# Patient Record
Sex: FEMALE | Race: WHITE | NOT HISPANIC OR LATINO | Employment: FULL TIME | URBAN - METROPOLITAN AREA
[De-identification: names, ages, dates, MRNs, and addresses within clinical notes are randomized per-mention and may not be internally consistent; named-entity substitution may affect disease eponyms.]

---

## 2018-08-30 ENCOUNTER — APPOINTMENT (OUTPATIENT)
Dept: URGENT CARE | Age: 53
End: 2018-08-30
Payer: OTHER MISCELLANEOUS

## 2018-08-30 PROCEDURE — 99284 EMERGENCY DEPT VISIT MOD MDM: CPT | Performed by: PREVENTIVE MEDICINE

## 2018-08-30 PROCEDURE — G0383 LEV 4 HOSP TYPE B ED VISIT: HCPCS | Performed by: PREVENTIVE MEDICINE

## 2019-03-30 ENCOUNTER — HOSPITAL ENCOUNTER (EMERGENCY)
Facility: HOSPITAL | Age: 54
Discharge: HOME | End: 2019-03-31
Attending: EMERGENCY MEDICINE | Admitting: EMERGENCY MEDICINE
Payer: COMMERCIAL

## 2019-03-30 ENCOUNTER — APPOINTMENT (EMERGENCY)
Dept: RADIOLOGY | Facility: HOSPITAL | Age: 54
End: 2019-03-30
Payer: COMMERCIAL

## 2019-03-30 VITALS
RESPIRATION RATE: 16 BRPM | DIASTOLIC BLOOD PRESSURE: 81 MMHG | OXYGEN SATURATION: 95 % | HEART RATE: 102 BPM | TEMPERATURE: 97.5 F | WEIGHT: 202.6 LBS | HEIGHT: 67 IN | SYSTOLIC BLOOD PRESSURE: 128 MMHG | BODY MASS INDEX: 31.8 KG/M2

## 2019-03-30 DIAGNOSIS — M54.9 BACK PAIN: Primary | ICD-10-CM

## 2019-03-30 DIAGNOSIS — R05.3 CHRONIC COUGH: ICD-10-CM

## 2019-03-30 DIAGNOSIS — T14.8XXA MUSCLE STRAIN: ICD-10-CM

## 2019-03-30 PROCEDURE — 99283 EMERGENCY DEPT VISIT LOW MDM: CPT

## 2019-03-30 PROCEDURE — 71046 X-RAY EXAM CHEST 2 VIEWS: CPT

## 2019-03-30 RX ORDER — LORATADINE 10 MG/1
10 TABLET ORAL DAILY
COMMUNITY
End: 2022-03-29

## 2019-03-30 RX ORDER — MONTELUKAST SODIUM 10 MG/1
10 TABLET ORAL
COMMUNITY

## 2019-03-30 RX ORDER — ATORVASTATIN CALCIUM 20 MG/1
10 TABLET, FILM COATED ORAL DAILY
COMMUNITY

## 2019-03-30 RX ORDER — TIZANIDINE HYDROCHLORIDE 2 MG/1
2 CAPSULE, GELATIN COATED ORAL DAILY PRN
COMMUNITY

## 2019-03-30 RX ORDER — GABAPENTIN 600 MG/1
600 TABLET ORAL 3 TIMES DAILY
COMMUNITY

## 2019-03-31 RX ORDER — BENZONATATE 100 MG/1
100 CAPSULE ORAL EVERY 8 HOURS
Qty: 21 CAPSULE | Refills: 0 | Status: SHIPPED | OUTPATIENT
Start: 2019-03-31

## 2020-01-13 ENCOUNTER — HOSPITAL ENCOUNTER (EMERGENCY)
Facility: HOSPITAL | Age: 55
Discharge: HOME | End: 2020-01-13
Attending: EMERGENCY MEDICINE
Payer: COMMERCIAL

## 2020-01-13 ENCOUNTER — APPOINTMENT (EMERGENCY)
Dept: RADIOLOGY | Facility: HOSPITAL | Age: 55
End: 2020-01-13
Payer: COMMERCIAL

## 2020-01-13 VITALS
WEIGHT: 192.02 LBS | OXYGEN SATURATION: 98 % | HEART RATE: 101 BPM | BODY MASS INDEX: 30.53 KG/M2 | RESPIRATION RATE: 18 BRPM | DIASTOLIC BLOOD PRESSURE: 90 MMHG | TEMPERATURE: 97.8 F | SYSTOLIC BLOOD PRESSURE: 141 MMHG

## 2020-01-13 DIAGNOSIS — S16.1XXA STRAIN OF NECK MUSCLE, INITIAL ENCOUNTER: Primary | ICD-10-CM

## 2020-01-13 DIAGNOSIS — M54.12 CERVICAL RADICULAR PAIN: ICD-10-CM

## 2020-01-13 PROCEDURE — 99283 EMERGENCY DEPT VISIT LOW MDM: CPT

## 2020-01-13 PROCEDURE — 72040 X-RAY EXAM NECK SPINE 2-3 VW: CPT

## 2020-01-13 PROCEDURE — 96372 THER/PROPH/DIAG INJ SC/IM: CPT

## 2020-01-13 PROCEDURE — 99284 EMERGENCY DEPT VISIT MOD MDM: CPT | Performed by: PHYSICIAN ASSISTANT

## 2020-01-13 RX ORDER — PREDNISONE 20 MG/1
40 TABLET ORAL DAILY
Qty: 8 TABLET | Refills: 0 | Status: SHIPPED | OUTPATIENT
Start: 2020-01-13 | End: 2020-01-17

## 2020-01-13 RX ORDER — PREDNISONE 20 MG/1
60 TABLET ORAL ONCE
Status: COMPLETED | OUTPATIENT
Start: 2020-01-13 | End: 2020-01-13

## 2020-01-13 RX ORDER — LIDOCAINE 50 MG/G
1 PATCH TOPICAL ONCE
Status: DISCONTINUED | OUTPATIENT
Start: 2020-01-13 | End: 2020-01-13 | Stop reason: HOSPADM

## 2020-01-13 RX ORDER — CYCLOBENZAPRINE HCL 5 MG
TABLET ORAL
Qty: 12 TABLET | Refills: 0 | Status: SHIPPED | OUTPATIENT
Start: 2020-01-13 | End: 2022-03-29

## 2020-01-13 RX ORDER — KETOROLAC TROMETHAMINE 30 MG/ML
30 INJECTION, SOLUTION INTRAMUSCULAR; INTRAVENOUS ONCE
Status: COMPLETED | OUTPATIENT
Start: 2020-01-13 | End: 2020-01-13

## 2020-01-13 RX ADMIN — PREDNISONE 60 MG: 20 TABLET ORAL at 12:12

## 2020-01-13 RX ADMIN — KETOROLAC TROMETHAMINE 30 MG: 30 INJECTION, SOLUTION INTRAMUSCULAR at 11:30

## 2020-01-14 ENCOUNTER — TELEPHONE (OUTPATIENT)
Dept: PHYSICAL THERAPY | Facility: OTHER | Age: 55
End: 2020-01-14

## 2020-01-14 NOTE — ED PROVIDER NOTES
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  Notes that over the past week she has been doing mandatory overtime at work and has to wear a harness to do been counts  States the harness puts direct pressure on this area and pain has been worse over the past week  No new injury  States the pain is also worse with movement of the head and neck  History provided by:  Patient   used: No    Shoulder Pain   Injury: no    Pain details:     Quality:  Aching and cramping    Severity:  Moderate    Onset quality:  Gradual    Duration:  1 week    Timing:  Constant    Progression:  Waxing and waning  Handedness:  Right-handed  Prior injury to area:  Unable to specify  Relieved by:  Rest  Worsened by: Movement (pressure on area)  Associated symptoms: decreased range of motion and neck pain    Associated symptoms: no back pain, no fatigue, no fever, no muscle weakness, no numbness, no stiffness, no swelling and no tingling        Prior to Admission Medications   Prescriptions Last Dose Informant Patient Reported? Taking?    TiZANidine (ZANAFLEX) 2 MG capsule   Yes Yes   Sig: Take 2 mg by mouth daily as needed for muscle spasms   atorvastatin (LIPITOR) 20 mg tablet   Yes Yes   Sig: Take 10 mg by mouth daily   benzonatate (TESSALON PERLES) 100 mg capsule   No Yes   Sig: Take 1 capsule (100 mg total) by mouth every 8 (eight) hours   gabapentin (NEURONTIN) 600 MG tablet   Yes Yes   Sig: Take 600 mg by mouth 3 (three) times a day   loratadine (CLARITIN) 10 mg tablet   Yes Yes   Sig: Take 10 mg by mouth daily   metFORMIN (GLUCOPHAGE) 850 mg tablet   Yes Yes   Sig: Take 850 mg by mouth 2 (two) times a day with meals   montelukast (SINGULAIR) 10 mg tablet   Yes Yes   Sig: Take 10 mg by mouth daily at bedtime      Facility-Administered Medications: None       Past Medical History:   Diagnosis Date    Asthma     Fibromyalgia        Past Surgical History:   Procedure Laterality Date    ANKLE SURGERY Left     CARPAL TUNNEL RELEASE      CARPAL TUNNEL RELEASE      TUBAL LIGATION         History reviewed  No pertinent family history  I have reviewed and agree with the history as documented  Social History     Tobacco Use    Smoking status: Former Smoker    Smokeless tobacco: Former User   Substance Use Topics    Alcohol use: Yes     Comment: occ    Drug use: Never        Review of Systems   Constitutional: Negative for activity change, appetite change, chills, fatigue and fever  HENT: Negative for congestion, dental problem, drooling, ear discharge, ear pain, mouth sores, nosebleeds, rhinorrhea, sore throat and trouble swallowing  Eyes: Negative for pain, discharge and itching  Respiratory: Negative for cough, chest tightness, shortness of breath and wheezing  Cardiovascular: Negative for chest pain and palpitations  Gastrointestinal: Negative for abdominal pain, blood in stool, constipation, diarrhea, nausea and vomiting  Endocrine: Negative for cold intolerance and heat intolerance  Genitourinary: Negative for difficulty urinating, dysuria, flank pain, frequency and urgency  Musculoskeletal: Positive for neck pain  Negative for back pain and stiffness  Skin: Negative for rash and wound  Allergic/Immunologic: Negative for food allergies and immunocompromised state  Neurological: Negative for dizziness, seizures, syncope, weakness, numbness and headaches  Psychiatric/Behavioral: Negative for agitation, behavioral problems and confusion  Physical Exam  Physical Exam   Constitutional: She is oriented to person, place, and time  Vital signs are normal  She appears well-developed and well-nourished  HENT:   Head: Normocephalic and atraumatic  Cardiovascular: Normal rate and regular rhythm  Pulmonary/Chest: Effort normal and breath sounds normal  No respiratory distress  She has no wheezes  She has no rhonchi  She has no rales  Musculoskeletal:        Cervical back: She exhibits decreased range of motion, tenderness, pain and spasm  She exhibits no bony tenderness, no swelling, no edema, no deformity, no laceration and normal pulse  Back:    Neurological: She is alert and oriented to person, place, and time  She has normal strength  No cranial nerve deficit or sensory deficit  GCS eye subscore is 4  GCS verbal subscore is 5  GCS motor subscore is 6  Skin: Skin is warm and dry  Psychiatric: She has a normal mood and affect  Her behavior is normal    Nursing note and vitals reviewed        Vital Signs  ED Triage Vitals [01/13/20 1058]   Temperature Pulse Respirations Blood Pressure SpO2   97 8 °F (36 6 °C) 101 18 141/90 98 %      Temp Source Heart Rate Source Patient Position - Orthostatic VS BP Location FiO2 (%)   Oral Monitor Lying Right arm --      Pain Score       9           Vitals:    01/13/20 1058   BP: 141/90   Pulse: 101   Patient Position - Orthostatic VS: Lying         Visual Acuity      ED Medications  Medications   ketorolac (TORADOL) injection 30 mg (30 mg Intramuscular Given 1/13/20 1130)   predniSONE tablet 60 mg (60 mg Oral Given 1/13/20 1212)       Diagnostic Studies  Results Reviewed     None                 XR cervical spine 2 or 3 views   ED Interpretation by Jairon Alexandra PA-C (01/13 1201)   Straightening of the cervical spine consistent with spasm  Final Result by Natalie Zepeda DO (01/13 1310)      No acute osseous abnormality  Degenerative changes as above  Workstation performed: [id number]                    Procedures  Procedures         ED Course                               MDM  Number of Diagnoses or Management Options  Cervical radicular pain:   Strain of neck muscle, initial encounter:   Diagnosis management comments: Differential diagnosis includes but not limited to:  Cervical strain, radicular pain, DJD, disc herniation, muscle spasm      Discussed treatment plan with patient  She blood sugars currently well controlled  Will start short course of prednisone  Will monitor closely  Amount and/or Complexity of Data Reviewed  Tests in the radiology section of CPT®: reviewed and ordered  Independent visualization of images, tracings, or specimens: yes          Disposition  Final diagnoses:   Strain of neck muscle, initial encounter   Cervical radicular pain     Time reflects when diagnosis was documented in both MDM as applicable and the Disposition within this note     Time User Action Codes Description Comment    1/13/2020 12:03 PM Viky Berg 28 [S16  1XXA] Strain of neck muscle, initial encounter     1/13/2020 12:03 PM Deedee Berg Em Add [U08 91] Cervical radicular pain       ED Disposition     ED Disposition Condition Date/Time Comment    Discharge Stable Mon Jan 13, 2020 12:03 PM Muriel Patton [facility] discharge to home/self care              Follow-up Information    None         Discharge Medication List as of 1/13/2020 12:04 PM      START taking these medications    Details   cyclobenzaprine (FLEXERIL) 5 mg tablet 1-2 PO TID PRN, Normal      predniSONE 20 mg tablet Take 2 tablets (40 mg total) by mouth daily for 4 days, Starting Mon 1/13/2020, Until Fri 1/17/2020, Normal         CONTINUE these medications which have NOT CHANGED    Details   atorvastatin (LIPITOR) 20 mg tablet Take 10 mg by mouth daily, Historical Med      benzonatate (TESSALON PERLES) 100 mg capsule Take 1 capsule (100 mg total) by mouth every 8 (eight) hours, Starting Sun 3/31/2019, Print      gabapentin (NEURONTIN) 600 MG tablet Take 600 mg by mouth 3 (three) times a day, Historical Med      loratadine (CLARITIN) 10 mg tablet Take 10 mg by mouth daily, Historical Med      metFORMIN (GLUCOPHAGE) 850 mg tablet Take 850 mg by mouth 2 (two) times a day with meals, Historical Med      montelukast (SINGULAIR) 10 mg tablet Take 10 mg by mouth daily at bedtime, Historical Med      TiZANidine (ZANAFLEX) 2 MG capsule Take 2 mg by mouth daily as needed for muscle spasms, Historical Med               ED Provider  Electronically Signed by           Sylvia Barahona PA-C  01/14/20 6058

## 2020-01-14 NOTE — TELEPHONE ENCOUNTER
Message left for Pt to call us back  Our ph # and hours of business provided  Waiting for return call from Pt  This was our first time calling Pt, and need to verify if Pt is using Workers Comp

## 2020-01-16 ENCOUNTER — TELEPHONE (OUTPATIENT)
Dept: PHYSICAL THERAPY | Facility: OTHER | Age: 55
End: 2020-01-16

## 2020-01-16 NOTE — TELEPHONE ENCOUNTER
Voice mail/message left for patient to return call to [place] [id number] program including our hours of business and phone number  Second attempt to reach patient regarding referral and discuss possible WC status  Deferred per protocol for f/u

## 2020-01-23 ENCOUNTER — TELEPHONE (OUTPATIENT)
Dept: PHYSICAL THERAPY | Facility: OTHER | Age: 55
End: 2020-01-23

## 2020-01-23 NOTE — TELEPHONE ENCOUNTER
Message left for Pt to call us back  Our ph # and hours of business provided  Waiting for return call from Pt  This was our 3rd attempt at calling this Pt      Referral closed

## 2020-01-23 NOTE — TELEPHONE ENCOUNTER
Patient returned our call  Nurse reviewed [facility] program and offerings  Patient declined to participate at this time  Patient stated she will contact her PCP for possible referral to PM for chronic pain as she has pain "in other areas" as well  Nurse encouraged her to call our program back in the future if needed  Agreed and appreciative of information

## 2020-09-11 ENCOUNTER — TELEPHONE (OUTPATIENT)
Dept: PAIN MEDICINE | Facility: MEDICAL CENTER | Age: 55
End: 2020-09-11

## 2020-10-09 ENCOUNTER — CONSULT (OUTPATIENT)
Dept: PAIN MEDICINE | Facility: MEDICAL CENTER | Age: 55
End: 2020-10-09
Payer: COMMERCIAL

## 2020-10-09 ENCOUNTER — APPOINTMENT (OUTPATIENT)
Dept: RADIOLOGY | Facility: MEDICAL CENTER | Age: 55
End: 2020-10-09
Payer: COMMERCIAL

## 2020-10-09 VITALS
HEIGHT: 67 IN | BODY MASS INDEX: 30.32 KG/M2 | HEART RATE: 101 BPM | SYSTOLIC BLOOD PRESSURE: 123 MMHG | WEIGHT: 193.2 LBS | DIASTOLIC BLOOD PRESSURE: 76 MMHG | TEMPERATURE: 97.7 F

## 2020-10-09 DIAGNOSIS — M54.12 CERVICAL RADICULOPATHY: ICD-10-CM

## 2020-10-09 DIAGNOSIS — M47.816 LUMBAR SPONDYLOSIS: ICD-10-CM

## 2020-10-09 DIAGNOSIS — G89.4 CHRONIC PAIN SYNDROME: ICD-10-CM

## 2020-10-09 DIAGNOSIS — M79.18 MYOFASCIAL PAIN SYNDROME: ICD-10-CM

## 2020-10-09 DIAGNOSIS — M54.2 NECK PAIN: ICD-10-CM

## 2020-10-09 DIAGNOSIS — M47.816 LUMBAR SPONDYLOSIS: Primary | ICD-10-CM

## 2020-10-09 PROCEDURE — 72114 X-RAY EXAM L-S SPINE BENDING: CPT

## 2020-10-09 PROCEDURE — 99244 OFF/OP CNSLTJ NEW/EST MOD 40: CPT | Performed by: PHYSICAL MEDICINE & REHABILITATION

## 2020-10-09 RX ORDER — NAPROXEN 500 MG/1
500 TABLET ORAL 2 TIMES DAILY WITH MEALS
Qty: 60 TABLET | Refills: 1 | Status: SHIPPED | OUTPATIENT
Start: 2020-10-09 | End: 2020-12-22 | Stop reason: SDUPTHER

## 2020-10-26 ENCOUNTER — HOSPITAL ENCOUNTER (OUTPATIENT)
Dept: MRI IMAGING | Facility: CLINIC | Age: 55
Discharge: HOME | End: 2020-10-26
Payer: COMMERCIAL

## 2020-10-26 DIAGNOSIS — M54.2 NECK PAIN: ICD-10-CM

## 2020-10-26 DIAGNOSIS — M54.12 CERVICAL RADICULOPATHY: ICD-10-CM

## 2020-10-26 DIAGNOSIS — M47.816 LUMBAR SPONDYLOSIS: ICD-10-CM

## 2020-10-26 DIAGNOSIS — G89.4 CHRONIC PAIN SYNDROME: ICD-10-CM

## 2020-10-26 DIAGNOSIS — M79.18 MYOFASCIAL PAIN SYNDROME: ICD-10-CM

## 2020-10-26 PROCEDURE — G1004 CDSM NDSC: HCPCS

## 2020-10-26 PROCEDURE — 72141 MRI NECK SPINE W/O DYE: CPT

## 2020-10-28 ENCOUNTER — HOSPITAL ENCOUNTER (OUTPATIENT)
Dept: RADIOLOGY | Facility: CLINIC | Age: 55
Discharge: HOME | End: 2020-10-28
Attending: PHYSICAL MEDICINE & REHABILITATION | Admitting: PHYSICAL MEDICINE & REHABILITATION
Payer: COMMERCIAL

## 2020-10-28 VITALS
RESPIRATION RATE: 18 BRPM | DIASTOLIC BLOOD PRESSURE: 81 MMHG | TEMPERATURE: 97.9 F | SYSTOLIC BLOOD PRESSURE: 132 MMHG | HEART RATE: 94 BPM | OXYGEN SATURATION: 97 %

## 2020-10-28 DIAGNOSIS — M47.816 LUMBAR SPONDYLOSIS: ICD-10-CM

## 2020-10-28 DIAGNOSIS — G89.4 CHRONIC PAIN SYNDROME: ICD-10-CM

## 2020-10-28 DIAGNOSIS — M79.18 MYOFASCIAL PAIN SYNDROME: ICD-10-CM

## 2020-10-28 PROCEDURE — 64494 INJ PARAVERT F JNT L/S 2 LEV: CPT | Performed by: PHYSICAL MEDICINE & REHABILITATION

## 2020-10-28 PROCEDURE — 64493 INJ PARAVERT F JNT L/S 1 LEV: CPT | Performed by: PHYSICAL MEDICINE & REHABILITATION

## 2020-10-28 RX ORDER — BUPIVACAINE HCL/PF 2.5 MG/ML
5 VIAL (ML) INJECTION ONCE
Status: COMPLETED | OUTPATIENT
Start: 2020-10-28 | End: 2020-10-28

## 2020-10-28 RX ADMIN — BUPIVACAINE HYDROCHLORIDE 3 ML: 2.5 INJECTION, SOLUTION EPIDURAL; INFILTRATION; INTRACAUDAL at 09:09

## 2020-11-04 ENCOUNTER — TRANSCRIBE ORDERS (OUTPATIENT)
Dept: PAIN MEDICINE | Facility: CLINIC | Age: 55
End: 2020-11-04

## 2020-11-05 ENCOUNTER — TELEPHONE (OUTPATIENT)
Dept: PAIN MEDICINE | Facility: CLINIC | Age: 55
End: 2020-11-05

## 2020-12-22 DIAGNOSIS — M47.816 LUMBAR SPONDYLOSIS: ICD-10-CM

## 2020-12-22 DIAGNOSIS — M79.18 MYOFASCIAL PAIN SYNDROME: ICD-10-CM

## 2020-12-22 DIAGNOSIS — G89.4 CHRONIC PAIN SYNDROME: ICD-10-CM

## 2020-12-22 DIAGNOSIS — M54.2 NECK PAIN: ICD-10-CM

## 2020-12-22 DIAGNOSIS — M54.12 CERVICAL RADICULOPATHY: ICD-10-CM

## 2020-12-23 ENCOUNTER — HOSPITAL ENCOUNTER (OUTPATIENT)
Dept: RADIOLOGY | Facility: CLINIC | Age: 55
Discharge: HOME | End: 2020-12-23
Attending: PHYSICAL MEDICINE & REHABILITATION | Admitting: PHYSICAL MEDICINE & REHABILITATION
Payer: COMMERCIAL

## 2020-12-23 VITALS
DIASTOLIC BLOOD PRESSURE: 72 MMHG | RESPIRATION RATE: 18 BRPM | SYSTOLIC BLOOD PRESSURE: 129 MMHG | HEART RATE: 84 BPM | TEMPERATURE: 97.7 F | OXYGEN SATURATION: 97 %

## 2020-12-23 DIAGNOSIS — M47.816 LUMBAR SPONDYLOSIS: ICD-10-CM

## 2020-12-23 PROCEDURE — 64493 INJ PARAVERT F JNT L/S 1 LEV: CPT | Performed by: PHYSICAL MEDICINE & REHABILITATION

## 2020-12-23 PROCEDURE — 64490 INJ PARAVERT F JNT C/T 1 LEV: CPT | Performed by: PHYSICAL MEDICINE & REHABILITATION

## 2020-12-23 RX ORDER — NAPROXEN 500 MG/1
500 TABLET ORAL 2 TIMES DAILY WITH MEALS
Qty: 60 TABLET | Refills: 0 | Status: SHIPPED | OUTPATIENT
Start: 2020-12-23 | End: 2022-03-29

## 2020-12-23 RX ORDER — BUPIVACAINE HYDROCHLORIDE 7.5 MG/ML
5 INJECTION, SOLUTION EPIDURAL; RETROBULBAR ONCE
Status: COMPLETED | OUTPATIENT
Start: 2020-12-23 | End: 2020-12-23

## 2020-12-23 RX ADMIN — BUPIVACAINE HYDROCHLORIDE 3 ML: 7.5 INJECTION, SOLUTION EPIDURAL; RETROBULBAR at 08:16

## 2021-01-04 ENCOUNTER — TRANSCRIBE ORDERS (OUTPATIENT)
Dept: PAIN MEDICINE | Facility: CLINIC | Age: 56
End: 2021-01-04

## 2021-01-04 ENCOUNTER — TELEPHONE (OUTPATIENT)
Dept: PAIN MEDICINE | Facility: CLINIC | Age: 56
End: 2021-01-04

## 2021-01-12 NOTE — TELEPHONE ENCOUNTER
Patient called requesting to know when her ablation procedure is going to be scheduled   Please advise, ajay    Call back# [phone]

## 2021-01-12 NOTE — TELEPHONE ENCOUNTER
Pain diary reviewed  Results look good  Please schedule patient for left and then right sided T12-L2 radiofrequency ablation  Thank you

## 2021-03-10 DIAGNOSIS — Z23 ENCOUNTER FOR IMMUNIZATION: ICD-10-CM

## 2021-05-04 ENCOUNTER — OFFICE VISIT (OUTPATIENT)
Dept: URGENT CARE | Facility: CLINIC | Age: 56
End: 2021-05-04
Payer: COMMERCIAL

## 2021-05-04 VITALS
HEART RATE: 110 BPM | SYSTOLIC BLOOD PRESSURE: 131 MMHG | RESPIRATION RATE: 18 BRPM | OXYGEN SATURATION: 99 % | DIASTOLIC BLOOD PRESSURE: 79 MMHG | TEMPERATURE: 98.3 F

## 2021-05-04 DIAGNOSIS — J06.9 ACUTE URI: Primary | ICD-10-CM

## 2021-05-04 PROCEDURE — 99213 OFFICE O/P EST LOW 20 MIN: CPT | Performed by: PHYSICIAN ASSISTANT

## 2021-05-04 PROCEDURE — U0003 INFECTIOUS AGENT DETECTION BY NUCLEIC ACID (DNA OR RNA); SEVERE ACUTE RESPIRATORY SYNDROME CORONAVIRUS 2 (SARS-COV-2) (CORONAVIRUS DISEASE [COVID-19]), AMPLIFIED PROBE TECHNIQUE, MAKING USE OF HIGH THROUGHPUT TECHNOLOGIES AS DESCRIBED BY CMS-2020-01-R: HCPCS | Performed by: PHYSICIAN ASSISTANT

## 2021-05-04 PROCEDURE — U0005 INFEC AGEN DETEC AMPLI PROBE: HCPCS | Performed by: PHYSICIAN ASSISTANT

## 2021-05-04 RX ORDER — FLUTICASONE PROPIONATE 50 MCG
SPRAY, SUSPENSION (ML) NASAL
COMMUNITY
End: 2022-03-29 | Stop reason: SDUPTHER

## 2021-05-04 RX ORDER — AMITRIPTYLINE HYDROCHLORIDE 25 MG/1
25 TABLET, FILM COATED ORAL
COMMUNITY
Start: 2021-01-06

## 2021-05-04 RX ORDER — PANTOPRAZOLE SODIUM 40 MG/1
40 TABLET, DELAYED RELEASE ORAL DAILY
COMMUNITY
Start: 2021-02-17 | End: 2022-03-29

## 2021-05-04 RX ORDER — TRAZODONE HYDROCHLORIDE 100 MG/1
TABLET ORAL
COMMUNITY
Start: 2021-03-22

## 2021-05-04 RX ORDER — MECLIZINE HYDROCHLORIDE 25 MG/1
25 TABLET ORAL 3 TIMES DAILY PRN
Qty: 30 TABLET | Refills: 0 | Status: SHIPPED | OUTPATIENT
Start: 2021-05-04

## 2021-05-04 NOTE — PATIENT INSTRUCTIONS
Hydration and rest   Tylenol and Motrin for headache or fevers  Over-the-counter decongestants  Meclizine as needed  COVID-19 testing  Home isolation  PCP follow-up  Go to emergency room with worsening symptoms, difficulty breathing  Recommended supplements for potential COVID-19 is the following: Vitamin D3 2000 IU  daily ,  Vitamin C 1g  every 12 hours , Multivitamin Daily     COVID-19 Home Care Guidelines    Your healthcare provider and/or public health staff have evaluated you and have determined that you do not need to remain in the hospital at this time  At this time you can be isolated at home where you will be monitored by staff from your local or state health department  You should carefully follow the prevention and isolation steps below until a healthcare provider or local or state health department says that you can return to your normal activities  Stay home except to get medical care    People who are mildly ill with COVID-19 are able to isolate at home during their illness  You should restrict activities outside your home, except for getting medical care  Do not go to work, school, or public areas  Avoid using public transportation, ride-sharing, or taxis  Separate yourself from other people and animals in your home    People: As much as possible, you should stay in a specific room and away from other people in your home  Also, you should use a separate bathroom, if available  Animals: You should restrict contact with pets and other animals while you are sick with COVID-19, just like you would around other people  Although there have not been reports of pets or other animals becoming sick with COVID-19, it is still recommended that people sick with COVID-19 limit contact with animals until more information is known about the virus  When possible, have another member of your household care for your animals while you are sick   If you are sick with COVID-19, avoid contact with your pet, including petting, snuggling, being kissed or licked, and sharing food  If you must care for your pet or be around animals while you are sick, wash your hands before and after you interact with pets and wear a facemask  See COVID-19 and Animals for more information  Call ahead before visiting your doctor    If you have a medical appointment, call the healthcare provider and tell them that you have or may have COVID-19  This will help the healthcare providers office take steps to keep other people from getting infected or exposed  Wear a facemask    You should wear a facemask when you are around other people (e g , sharing a room or vehicle) or pets and before you enter a healthcare providers office  If you are not able to wear a facemask (for example, because it causes trouble breathing), then people who live with you should not stay in the same room with you, or they should wear a facemask if they enter your room  Cover your coughs and sneezes    Cover your mouth and nose with a tissue when you cough or sneeze  Throw used tissues in a lined trash can  Immediately wash your hands with soap and water for at least 20 seconds or, if soap and water are not available, clean your hands with an alcohol-based hand  that contains at least 60% alcohol  Clean your hands often    Wash your hands often with soap and water for at least 20 seconds, especially after blowing your nose, coughing, or sneezing; going to the bathroom; and before eating or preparing food  If soap and water are not readily available, use an alcohol-based hand  with at least 60% alcohol, covering all surfaces of your hands and rubbing them together until they feel dry  Soap and water are the best option if hands are visibly dirty  Avoid touching your eyes, nose, and mouth with unwashed hands      Avoid sharing personal household items    You should not share dishes, drinking glasses, cups, eating utensils, towels, or bedding with other people or pets in your home  After using these items, they should be washed thoroughly with soap and water  Clean all high-touch surfaces everyday    High touch surfaces include counters, tabletops, doorknobs, bathroom fixtures, toilets, phones, keyboards, tablets, and bedside tables  Also, clean any surfaces that may have blood, stool, or body fluids on them  Use a household cleaning spray or wipe, according to the label instructions  Labels contain instructions for safe and effective use of the cleaning product including precautions you should take when applying the product, such as wearing gloves and making sure you have good ventilation during use of the product  Monitor your symptoms    Seek prompt medical attention if your illness is worsening (e g , difficulty breathing)  Before seeking care, call your healthcare provider and tell them that you have, or are being evaluated for, COVID-19  Put on a facemask before you enter the facility  These steps will help the healthcare providers office to keep other people in the office or waiting room from getting infected or exposed  Ask your healthcare provider to call the local or [facility] health department  Persons who are placed under active monitoring or facilitated self-monitoring should follow instructions provided by their local health department or occupational health professionals, as appropriate  If you have a medical emergency and need to call 911, notify the dispatch personnel that you have, or are being evaluated for COVID-19  If possible, put on a facemask before emergency medical services arrive      Discontinuing home isolation    Patients with confirmed COVID-19 should remain under home isolation precautions until the following conditions are met:   - They have had no fever for at least 24 hours (that is one full day of no fever without the use medicine that reduces fevers)  AND  - other symptoms have improved (for example, when their cough or shortness of breath have improved)  AND  - If had mild or moderate illness, at least 10 days have passed since their symptoms first appeared or if severe illness (needed oxygen) or immunosuppressed, at least 20 days have passed since symptoms first appeared  Patients with confirmed COVID-19 should also notify close contacts (including their workplace) and ask that they self-quarantine  Currently, close contact is defined as being within 6 feet for 15 minutes or more from the period 24 hours starting 48 hours before symptom onset to the time at which the patient went into isolation  Close contacts of patients diagnosed with COVID-19 should be instructed by the patient to self-quarantine for 14 days from the last time of their last contact with the patient       Source: [employer]

## 2021-05-04 NOTE — PROGRESS NOTES
[facility]        NAME: Marylen Estelle is a 49 y o  female  : 1965    MRN: [id number]  DATE: May 4, 2021  TIME: 9:00 AM    Assessment and Plan   Acute URI [J06 9]  1  Acute URI  Novel Coronavirus (Covid-19),[facility]    meclizine (ANTIVERT) 25 mg tablet         Patient Instructions     Patient Instructions     Hydration and rest   Tylenol and Motrin for headache or fevers  Over-the-counter decongestants  Meclizine as needed  COVID-19 testing  Home isolation  PCP follow-up  Go to emergency room with worsening symptoms, difficulty breathing  Recommended supplements for potential COVID-19 is the following: Vitamin D3 2000 IU  daily ,  Vitamin C 1g  every 12 hours , Multivitamin Daily     COVID-19 Home Care Guidelines    Your healthcare provider and/or public health staff have evaluated you and have determined that you do not need to remain in the hospital at this time  At this time you can be isolated at home where you will be monitored by staff from your local or state health department  You should carefully follow the prevention and isolation steps below until a healthcare provider or local or state health department says that you can return to your normal activities  Stay home except to get medical care    People who are mildly ill with COVID-19 are able to isolate at home during their illness  You should restrict activities outside your home, except for getting medical care  Do not go to work, school, or public areas  Avoid using public transportation, ride-sharing, or taxis  Separate yourself from other people and animals in your home    People: As much as possible, you should stay in a specific room and away from other people in your home  Also, you should use a separate bathroom, if available  Animals: You should restrict contact with pets and other animals while you are sick with COVID-19, just like you would around other people   Although there have not been reports of pets or other animals becoming sick with COVID-19, it is still recommended that people sick with COVID-19 limit contact with animals until more information is known about the virus  When possible, have another member of your household care for your animals while you are sick  If you are sick with COVID-19, avoid contact with your pet, including petting, snuggling, being kissed or licked, and sharing food  If you must care for your pet or be around animals while you are sick, wash your hands before and after you interact with pets and wear a facemask  See COVID-19 and Animals for more information  Call ahead before visiting your doctor    If you have a medical appointment, call the healthcare provider and tell them that you have or may have COVID-19  This will help the healthcare providers office take steps to keep other people from getting infected or exposed  Wear a facemask    You should wear a facemask when you are around other people (e g , sharing a room or vehicle) or pets and before you enter a healthcare providers office  If you are not able to wear a facemask (for example, because it causes trouble breathing), then people who live with you should not stay in the same room with you, or they should wear a facemask if they enter your room  Cover your coughs and sneezes    Cover your mouth and nose with a tissue when you cough or sneeze  Throw used tissues in a lined trash can  Immediately wash your hands with soap and water for at least 20 seconds or, if soap and water are not available, clean your hands with an alcohol-based hand  that contains at least 60% alcohol  Clean your hands often    Wash your hands often with soap and water for at least 20 seconds, especially after blowing your nose, coughing, or sneezing; going to the bathroom; and before eating or preparing food   If soap and water are not readily available, use an alcohol-based hand  with at least 60% alcohol, covering all surfaces of your hands and rubbing them together until they feel dry  Soap and water are the best option if hands are visibly dirty  Avoid touching your eyes, nose, and mouth with unwashed hands  Avoid sharing personal household items    You should not share dishes, drinking glasses, cups, eating utensils, towels, or bedding with other people or pets in your home  After using these items, they should be washed thoroughly with soap and water  Clean all high-touch surfaces everyday    High touch surfaces include counters, tabletops, doorknobs, bathroom fixtures, toilets, phones, keyboards, tablets, and bedside tables  Also, clean any surfaces that may have blood, stool, or body fluids on them  Use a household cleaning spray or wipe, according to the label instructions  Labels contain instructions for safe and effective use of the cleaning product including precautions you should take when applying the product, such as wearing gloves and making sure you have good ventilation during use of the product  Monitor your symptoms    Seek prompt medical attention if your illness is worsening (e g , difficulty breathing)  Before seeking care, call your healthcare provider and tell them that you have, or are being evaluated for, COVID-19  Put on a facemask before you enter the facility  These steps will help the healthcare providers office to keep other people in the office or waiting room from getting infected or exposed  Ask your healthcare provider to call the local or [facility] health department  Persons who are placed under active monitoring or facilitated self-monitoring should follow instructions provided by their local health department or occupational health professionals, as appropriate  If you have a medical emergency and need to call 911, notify the dispatch personnel that you have, or are being evaluated for COVID-19   If possible, put on a facemask before emergency medical services arrive  Discontinuing home isolation    Patients with confirmed COVID-19 should remain under home isolation precautions until the following conditions are met:   - They have had no fever for at least 24 hours (that is one full day of no fever without the use medicine that reduces fevers)  AND  - other symptoms have improved (for example, when their cough or shortness of breath have improved)  AND  - If had mild or moderate illness, at least 10 days have passed since their symptoms first appeared or if severe illness (needed oxygen) or immunosuppressed, at least 20 days have passed since symptoms first appeared  Patients with confirmed COVID-19 should also notify close contacts (including their workplace) and ask that they self-quarantine  Currently, close contact is defined as being within 6 feet for 15 minutes or more from the period 24 hours starting 48 hours before symptom onset to the time at which the patient went into isolation  Close contacts of patients diagnosed with COVID-19 should be instructed by the patient to self-quarantine for 14 days from the last time of their last contact with the patient  Source: [employer]          **Portions of the record may have been created with voice recognition software  Occasional wrong word or "sound a like" substitutions may have occurred due to the inherent limitations of voice recognition software  Read the chart carefully and recognize, using context, where substitutions have occurred  **     Chief Complaint     Chief Complaint   Patient presents with    Earache     Pt c/o earache and vertigo         History of Present Illness       51-year-old female presents to clinic with complaints of fatigue, dizziness, headache and congestion x1 day  She reports occasional cough  States history of seasonal allergies and asthma , as well as vertigo    She denies any fever, chills, nausea, vomiting, diarrhea,  shortness of breath   0 got patient had her COVID-19 vaccination back in March  No known sick contacts or recent travel  Review of Systems     Review of Systems   Constitutional: Positive for fatigue  Negative for chills and fever  HENT: Positive for congestion and ear pain  Negative for sneezing and sore throat  Respiratory: Positive for cough  Negative for shortness of breath  Cardiovascular: Negative for chest pain  Gastrointestinal: Negative for diarrhea, nausea and vomiting  Musculoskeletal: Negative for myalgias  Skin: Negative for rash  Neurological: Positive for dizziness and headaches           Current Medications     Current Outpatient Medications:     amitriptyline (ELAVIL) 25 mg tablet, Take 25 mg by mouth, Disp: , Rfl:     atorvastatin (LIPITOR) 20 mg tablet, Take 10 mg by mouth daily, Disp: , Rfl:     fluticasone (FLONASE) 50 mcg/act nasal spray, fluticasone propionate 50 mcg/actuation nasal spray,suspension, Disp: , Rfl:     gabapentin (NEURONTIN) 600 MG tablet, Take 600 mg by mouth 3 (three) times a day, Disp: , Rfl:     metFORMIN (GLUCOPHAGE) 850 mg tablet, Take 850 mg by mouth 2 (two) times a day with meals, Disp: , Rfl:     montelukast (SINGULAIR) 10 mg tablet, Take 10 mg by mouth daily at bedtime, Disp: , Rfl:     pantoprazole (PROTONIX) 40 mg tablet, Take 40 mg by mouth daily, Disp: , Rfl:     TiZANidine (ZANAFLEX) 2 MG capsule, Take 2 mg by mouth daily as needed for muscle spasms, Disp: , Rfl:     traZODone (DESYREL) 100 mg tablet, TAKE 1 TABLET NIGHTLY, Disp: , Rfl:     benzonatate (TESSALON PERLES) 100 mg capsule, Take 1 capsule (100 mg total) by mouth every 8 (eight) hours (Patient not taking: Reported on 5/4/2021), Disp: 21 capsule, Rfl: 0    cyclobenzaprine (FLEXERIL) 5 mg tablet, 1-2 PO TID PRN (Patient not taking: Reported on 5/4/2021), Disp: 12 tablet, Rfl: 0    loratadine (CLARITIN) 10 mg tablet, Take 10 mg by mouth daily, Disp: , Rfl:    meclizine (ANTIVERT) 25 mg tablet, Take 1 tablet (25 mg total) by mouth 3 (three) times a day as needed for dizziness, Disp: 30 tablet, Rfl: 0    naproxen (NAPROSYN) 500 mg tablet, Take 1 tablet (500 mg total) by mouth 2 (two) times a day with meals (Patient not taking: Reported on 5/4/2021), Disp: 60 tablet, Rfl: 0    Current Allergies     Allergies as of 05/04/2021 - Reviewed 05/04/2021   Allergen Reaction Noted    Latex  01/13/2020            The following portions of the patient's history were reviewed and updated as appropriate: allergies, current medications, past family history, past medical history, past social history, past surgical history and problem list      Past Medical History:   Diagnosis Date    Asthma     Fibromyalgia        Past Surgical History:   Procedure Laterality Date    ANKLE SURGERY Left     CARPAL TUNNEL RELEASE      CARPAL TUNNEL RELEASE      TUBAL LIGATION         History reviewed  No pertinent family history  Medications have been verified  Objective     /79   Pulse (!) 110   Temp 98 3 °F (36 8 °C) (Temporal)   Resp 18   SpO2 99%        Physical Exam     Physical Exam  Vitals signs and nursing note reviewed  Constitutional:       General: She is not in acute distress  Appearance: Normal appearance  She is not ill-appearing  HENT:      Head: Normocephalic and atraumatic  Right Ear: Tympanic membrane and ear canal normal       Left Ear: Tympanic membrane and ear canal normal       Nose: Congestion and rhinorrhea present  Mouth/Throat:      Pharynx: Posterior oropharyngeal erythema present  Cardiovascular:      Rate and Rhythm: Regular rhythm  Tachycardia present  Pulmonary:      Effort: Pulmonary effort is normal       Breath sounds: Normal breath sounds  Skin:     General: Skin is warm and dry  Findings: No rash  Neurological:      Mental Status: She is alert

## 2021-05-04 NOTE — LETTER
May 4, 2021     Patient: Hernandez Sewell   YOB: 1965   Date of Visit: 5/4/2021       To Whom it May Concern:    Rockwell Hodgkin was seen in my clinic on 5/4/2021  May not return to work until COVID-19 test results  If you have any questions or concerns, please don't hesitate to call           Sincerely,          Agata Hernandez PA-C        CC: No Recipients

## 2021-05-05 LAB — SARS-COV-2 RNA RESP QL NAA+PROBE: NEGATIVE

## 2021-05-10 DIAGNOSIS — J06.9 ACUTE URI: ICD-10-CM

## 2021-05-11 RX ORDER — MECLIZINE HYDROCHLORIDE 25 MG/1
TABLET ORAL
Qty: 30 TABLET | Refills: 0 | OUTPATIENT
Start: 2021-05-11

## 2021-07-07 ENCOUNTER — HOSPITAL ENCOUNTER (EMERGENCY)
Facility: HOSPITAL | Age: 56
Discharge: HOME | End: 2021-07-07
Attending: EMERGENCY MEDICINE | Admitting: EMERGENCY MEDICINE
Payer: COMMERCIAL

## 2021-07-07 ENCOUNTER — APPOINTMENT (EMERGENCY)
Dept: CT IMAGING | Facility: HOSPITAL | Age: 56
End: 2021-07-07
Payer: COMMERCIAL

## 2021-07-07 VITALS
OXYGEN SATURATION: 97 % | HEART RATE: 94 BPM | RESPIRATION RATE: 18 BRPM | DIASTOLIC BLOOD PRESSURE: 107 MMHG | WEIGHT: 200 LBS | SYSTOLIC BLOOD PRESSURE: 164 MMHG | BODY MASS INDEX: 32.28 KG/M2 | TEMPERATURE: 98 F

## 2021-07-07 DIAGNOSIS — R51.9 HEADACHE: ICD-10-CM

## 2021-07-07 DIAGNOSIS — J32.2 ETHMOID SINUSITIS: Primary | ICD-10-CM

## 2021-07-07 LAB
ANION GAP SERPL CALCULATED.3IONS-SCNC: 10 MMOL/L (ref 4–13)
BASOPHILS # BLD AUTO: 0.01 THOUSANDS/ΜL (ref 0–0.1)
BASOPHILS NFR BLD AUTO: 0 % (ref 0–1)
BUN SERPL-MCNC: 19 MG/DL (ref 5–25)
CALCIUM SERPL-MCNC: 9.6 MG/DL (ref 8.3–10.1)
CHLORIDE SERPL-SCNC: 104 MMOL/L (ref 100–108)
CO2 SERPL-SCNC: 29 MMOL/L (ref 21–32)
CREAT SERPL-MCNC: 0.79 MG/DL (ref 0.6–1.3)
EOSINOPHIL # BLD AUTO: 0.13 THOUSAND/ΜL (ref 0–0.61)
EOSINOPHIL NFR BLD AUTO: 3 % (ref 0–6)
ERYTHROCYTE [DISTWIDTH] IN BLOOD BY AUTOMATED COUNT: 13.9 % (ref 11.6–15.1)
ERYTHROCYTE [SEDIMENTATION RATE] IN BLOOD: 8 MM/HOUR (ref 0–29)
EXT PREG TEST URINE: NEGATIVE
EXT. CONTROL ED NAV: NORMAL
GFR SERPL CREATININE-BSD FRML MDRD: 85 ML/MIN/1.73SQ M
GLUCOSE SERPL-MCNC: 120 MG/DL (ref 65–140)
HCT VFR BLD AUTO: 39.4 % (ref 34.8–46.1)
HGB BLD-MCNC: 12.4 G/DL (ref 11.5–15.4)
IMM GRANULOCYTES # BLD AUTO: 0.01 THOUSAND/UL (ref 0–0.2)
IMM GRANULOCYTES NFR BLD AUTO: 0 % (ref 0–2)
LYMPHOCYTES # BLD AUTO: 1.26 THOUSANDS/ΜL (ref 0.6–4.47)
LYMPHOCYTES NFR BLD AUTO: 27 % (ref 14–44)
MCH RBC QN AUTO: 24.8 PG (ref 26.8–34.3)
MCHC RBC AUTO-ENTMCNC: 31.5 G/DL (ref 31.4–37.4)
MCV RBC AUTO: 79 FL (ref 82–98)
MONOCYTES # BLD AUTO: 0.38 THOUSAND/ΜL (ref 0.17–1.22)
MONOCYTES NFR BLD AUTO: 8 % (ref 4–12)
NEUTROPHILS # BLD AUTO: 2.89 THOUSANDS/ΜL (ref 1.85–7.62)
NEUTS SEG NFR BLD AUTO: 62 % (ref 43–75)
NRBC BLD AUTO-RTO: 0 /100 WBCS
PLATELET # BLD AUTO: 228 THOUSANDS/UL (ref 149–390)
PMV BLD AUTO: 10.3 FL (ref 8.9–12.7)
POTASSIUM SERPL-SCNC: 4.3 MMOL/L (ref 3.5–5.3)
RBC # BLD AUTO: 5.01 MILLION/UL (ref 3.81–5.12)
SODIUM SERPL-SCNC: 143 MMOL/L (ref 136–145)
WBC # BLD AUTO: 4.68 THOUSAND/UL (ref 4.31–10.16)

## 2021-07-07 PROCEDURE — 96375 TX/PRO/DX INJ NEW DRUG ADDON: CPT

## 2021-07-07 PROCEDURE — 99284 EMERGENCY DEPT VISIT MOD MDM: CPT | Performed by: PHYSICIAN ASSISTANT

## 2021-07-07 PROCEDURE — 85652 RBC SED RATE AUTOMATED: CPT | Performed by: PHYSICIAN ASSISTANT

## 2021-07-07 PROCEDURE — 36415 COLL VENOUS BLD VENIPUNCTURE: CPT | Performed by: PHYSICIAN ASSISTANT

## 2021-07-07 PROCEDURE — 80048 BASIC METABOLIC PNL TOTAL CA: CPT | Performed by: PHYSICIAN ASSISTANT

## 2021-07-07 PROCEDURE — 70450 CT HEAD/BRAIN W/O DYE: CPT

## 2021-07-07 PROCEDURE — 99284 EMERGENCY DEPT VISIT MOD MDM: CPT

## 2021-07-07 PROCEDURE — 85025 COMPLETE CBC W/AUTO DIFF WBC: CPT | Performed by: PHYSICIAN ASSISTANT

## 2021-07-07 PROCEDURE — 96374 THER/PROPH/DIAG INJ IV PUSH: CPT

## 2021-07-07 PROCEDURE — 81025 URINE PREGNANCY TEST: CPT | Performed by: PHYSICIAN ASSISTANT

## 2021-07-07 RX ORDER — METOCLOPRAMIDE HYDROCHLORIDE 5 MG/ML
10 INJECTION INTRAMUSCULAR; INTRAVENOUS ONCE
Status: COMPLETED | OUTPATIENT
Start: 2021-07-07 | End: 2021-07-07

## 2021-07-07 RX ORDER — DIPHENHYDRAMINE HYDROCHLORIDE 50 MG/ML
25 INJECTION INTRAMUSCULAR; INTRAVENOUS ONCE
Status: COMPLETED | OUTPATIENT
Start: 2021-07-07 | End: 2021-07-07

## 2021-07-07 RX ORDER — HYDROXYZINE HYDROCHLORIDE 25 MG/1
25 TABLET, FILM COATED ORAL EVERY 6 HOURS PRN
Qty: 20 TABLET | Refills: 0 | Status: SHIPPED | OUTPATIENT
Start: 2021-07-07 | End: 2022-03-29

## 2021-07-07 RX ORDER — KETOROLAC TROMETHAMINE 30 MG/ML
30 INJECTION, SOLUTION INTRAMUSCULAR; INTRAVENOUS ONCE
Status: COMPLETED | OUTPATIENT
Start: 2021-07-07 | End: 2021-07-07

## 2021-07-07 RX ORDER — FLUTICASONE PROPIONATE 50 MCG
1 SPRAY, SUSPENSION (ML) NASAL DAILY
Qty: 16 G | Refills: 0 | Status: SHIPPED | OUTPATIENT
Start: 2021-07-07

## 2021-07-07 RX ADMIN — KETOROLAC TROMETHAMINE 30 MG: 30 INJECTION, SOLUTION INTRAMUSCULAR at 14:29

## 2021-07-07 RX ADMIN — METOCLOPRAMIDE HYDROCHLORIDE 10 MG: 5 INJECTION INTRAMUSCULAR; INTRAVENOUS at 14:29

## 2021-07-07 RX ADMIN — DIPHENHYDRAMINE HYDROCHLORIDE 25 MG: 50 INJECTION, SOLUTION INTRAMUSCULAR; INTRAVENOUS at 14:29

## 2021-07-07 NOTE — ED PROVIDER NOTES
History  Chief Complaint   Patient presents with    Headache     head pain started at 2 am where she feels like she got hit in the head by a 2X4 and now states she feels like "the inside of my eye is swollen" Sent here by urgent care  [de-identified] old female with past medical history significant for fibromyalgia and hyperlipidemia presents to the emergency department with chief complaint of headache  Onset of symptoms reported as 2:00 a m  this morning  Location of symptoms reported as the left side of the head in the temporal area with radiation to the left eye  Quality is reported as sensation of swelling behind my eye"  Severity is reported as mild to moderate  Associated symptoms:  Denies bleeding or loss of vision  He positive for nausea  Denies vomiting  Denies syncope  Denies upper lower extremity paralysis paresthesia or weakness  Denies facial swelling  Denies sore throat  Denies fevers  Denies diplopia  Modifying factors:  Nothing has been tried for treatment of symptoms  Context:  Denies any acute fall injury or trauma  Denies any history of headaches in the past   Denies that this is the worst headache of life or that the headache was sudden onset of maximal intensity at onset  Denies rash  Denies fevers  Denies neck pain  Patient was seen at urgent care and sent to ED for further evaluation   Old charts reviewed: patient last seen in ed on 1/13/20 for evaluation of neck pain       History provided by:  Patient   used: No    Headache  Associated symptoms: no abdominal pain, no back pain, no congestion, no cough, no diarrhea, no dizziness, no drainage, no ear pain, no eye pain, no fatigue, no fever, no hearing loss, no myalgias, no nausea, no neck pain, no neck stiffness, no numbness, no photophobia, no seizures, no sinus pressure, no sore throat, no vomiting and no weakness        Prior to Admission Medications   Prescriptions Last Dose Informant Patient Reported? Taking? TiZANidine (ZANAFLEX) 2 MG capsule   Yes No   Sig: Take 2 mg by mouth daily as needed for muscle spasms   amitriptyline (ELAVIL) 25 mg tablet   Yes No   Sig: Take 25 mg by mouth   atorvastatin (LIPITOR) 20 mg tablet   Yes No   Sig: Take 10 mg by mouth daily   benzonatate (TESSALON PERLES) 100 mg capsule   No No   Sig: Take 1 capsule (100 mg total) by mouth every 8 (eight) hours   Patient not taking: Reported on 2021   cyclobenzaprine (FLEXERIL) 5 mg tablet   No No   Si-2 PO TID PRN   Patient not taking: Reported on 2021   fluticasone (FLONASE) 50 mcg/act nasal spray   Yes No   Sig: fluticasone propionate 50 mcg/actuation nasal spray,suspension   gabapentin (NEURONTIN) 600 MG tablet   Yes No   Sig: Take 600 mg by mouth 3 (three) times a day   loratadine (CLARITIN) 10 mg tablet   Yes No   Sig: Take 10 mg by mouth daily   meclizine (ANTIVERT) 25 mg tablet   No No   Sig: Take 1 tablet (25 mg total) by mouth 3 (three) times a day as needed for dizziness   metFORMIN (GLUCOPHAGE) 850 mg tablet   Yes No   Sig: Take 850 mg by mouth 2 (two) times a day with meals   montelukast (SINGULAIR) 10 mg tablet   Yes No   Sig: Take 10 mg by mouth daily at bedtime   naproxen (NAPROSYN) 500 mg tablet   No No   Sig: Take 1 tablet (500 mg total) by mouth 2 (two) times a day with meals   Patient not taking: Reported on 2021   pantoprazole (PROTONIX) 40 mg tablet   Yes No   Sig: Take 40 mg by mouth daily   traZODone (DESYREL) 100 mg tablet   Yes No   Sig: TAKE 1 TABLET NIGHTLY      Facility-Administered Medications: None       Past Medical History:   Diagnosis Date    Asthma     Fibromyalgia        Past Surgical History:   Procedure Laterality Date    ANKLE SURGERY Left     CARPAL TUNNEL RELEASE      CARPAL TUNNEL RELEASE      TUBAL LIGATION         History reviewed  No pertinent family history  I have reviewed and agree with the history as documented      E-Cigarette/Vaping     E-Cigarette/Vaping Substances     Social History     Tobacco Use    Smoking status: Former Smoker    Smokeless tobacco: Former User   Substance Use Topics    Alcohol use: Yes     Comment: occ    Drug use: Never       Review of Systems   Constitutional: Negative for activity change, appetite change, chills, diaphoresis, fatigue, fever and unexpected weight change  HENT: Negative for congestion, dental problem, drooling, ear discharge, ear pain, facial swelling, hearing loss, mouth sores, nosebleeds, postnasal drip, rhinorrhea, sinus pressure, sinus pain, sneezing, sore throat, tinnitus, trouble swallowing and voice change  Eyes: Negative for photophobia, pain, discharge, redness, itching and visual disturbance  Respiratory: Negative for apnea, cough, choking, chest tightness, shortness of breath, wheezing and stridor  Cardiovascular: Negative for chest pain, palpitations and leg swelling  Gastrointestinal: Negative for abdominal distention, abdominal pain, anal bleeding, blood in stool, constipation, diarrhea, nausea, rectal pain and vomiting  Endocrine: Negative for cold intolerance, heat intolerance, polydipsia, polyphagia and polyuria  Genitourinary: Negative for decreased urine volume, difficulty urinating, dyspareunia, dysuria, enuresis, flank pain, frequency, hematuria, menstrual problem, pelvic pain, urgency, vaginal bleeding, vaginal discharge and vaginal pain  Musculoskeletal: Negative for arthralgias, back pain, gait problem, joint swelling, myalgias, neck pain and neck stiffness  Skin: Negative for color change, pallor, rash and wound  Allergic/Immunologic: Negative for environmental allergies, food allergies and immunocompromised state  Neurological: Positive for headaches  Negative for dizziness, tremors, seizures, syncope, facial asymmetry, speech difficulty, weakness, light-headedness and numbness  Hematological: Negative for adenopathy  Does not bruise/bleed easily  Psychiatric/Behavioral: Negative for agitation, confusion, hallucinations, self-injury and suicidal ideas  The patient is not hyperactive  All other systems reviewed and are negative  Physical Exam  Physical Exam  Vitals and nursing note reviewed  Constitutional:       General: She is not in acute distress  Appearance: Normal appearance  She is well-developed  She is not ill-appearing  Comments: BP (!) 164/107 (BP Location: Left arm)   Pulse 94   Temp 98 °F (36 7 °C) (Oral)   Resp 18   Wt 90 7 kg (200 lb)   SpO2 97%   BMI 32 28 kg/m²      HENT:      Head: Normocephalic and atraumatic  Right Ear: External ear normal       Left Ear: External ear normal       Nose: Nose normal       Mouth/Throat:      Mouth: Mucous membranes are moist    Eyes:      General: No scleral icterus  Right eye: No discharge  Left eye: No discharge  Extraocular Movements: Extraocular movements intact  Conjunctiva/sclera: Conjunctivae normal       Pupils: Pupils are equal, round, and reactive to light  Comments: Bilateral eye exam   Normal inspection  Eyelids and eyelashes appear normal   No ptosis or proptosis  Extraocular movements: Intact  Sclera anicteric and not injected  Conjunctiva normal without erythema or exudate  Pupils equal round reactive to light  No periorbital ecchymosis or erythema  Intra-ocular pressures were measured to the left eye using the eye care device  Measured pressures of 11/12/12  Left eye -  Normal   No elevation of IOP  Cardiovascular:      Rate and Rhythm: Normal rate  Pulses: Normal pulses  Pulmonary:      Effort: Pulmonary effort is normal  No respiratory distress  Musculoskeletal:         General: No tenderness, deformity or signs of injury  Normal range of motion  Cervical back: Normal range of motion and neck supple  No rigidity or tenderness  No muscular tenderness  Lymphadenopathy:      Cervical: No cervical adenopathy  Skin:     Capillary Refill: Capillary refill takes less than 2 seconds  Coloration: Skin is not jaundiced or pale  Findings: No erythema or rash  Neurological:      General: No focal deficit present  Mental Status: She is alert and oriented to person, place, and time  Mental status is at baseline  Sensory: No sensory deficit  Motor: No weakness        Gait: Gait normal    Psychiatric:         Mood and Affect: Mood normal          Behavior: Behavior normal          Vital Signs  ED Triage Vitals [07/07/21 1234]   Temperature Pulse Respirations Blood Pressure SpO2   98 °F (36 7 °C) 94 18 (!) 164/107 97 %      Temp Source Heart Rate Source Patient Position - Orthostatic VS BP Location FiO2 (%)   Oral Monitor Sitting Left arm --      Pain Score       8           Vitals:    07/07/21 1234   BP: (!) 164/107   Pulse: 94   Patient Position - Orthostatic VS: Sitting         Visual Acuity      ED Medications  Medications   ketorolac (TORADOL) injection 30 mg (30 mg Intravenous Given 7/7/21 1429)   diphenhydrAMINE (BENADRYL) injection 25 mg (25 mg Intravenous Given 7/7/21 1429)   metoclopramide (REGLAN) injection 10 mg (10 mg Intravenous Given 7/7/21 1429)       Diagnostic Studies  Results Reviewed     Procedure Component Value Units Date/Time    Basic metabolic panel [[id number]] Collected: 07/07/21 1428    Lab Status: Final result Specimen: Blood from Arm, Left Updated: 07/07/21 1455     Sodium 143 mmol/L      Potassium 4 3 mmol/L      Chloride 104 mmol/L      CO2 29 mmol/L      ANION GAP 10 mmol/L      BUN 19 mg/dL      Creatinine 0 79 mg/dL      Glucose 120 mg/dL      Calcium 9 6 mg/dL      eGFR 85 ml/min/1 73sq m     Narrative:      [place] guidelines for Chronic Kidney Disease (CKD):     Stage 1 with normal or high GFR (GFR > 90 mL/min/1 73 square meters)    Stage 2 Mild CKD (GFR = 60-89 mL/min/1 73 square meters)    Stage 3A Moderate CKD (GFR = 45-59 mL/min/1 73 square meters)    Stage 3B Moderate CKD (GFR = 30-44 mL/min/1 73 square meters)    Stage 4 Severe CKD (GFR = 15-29 mL/min/1 73 square meters)    Stage 5 End Stage CKD (GFR <15 mL/min/1 73 square meters)  Note: GFR calculation is accurate only with a steady state creatinine    Sedimentation rate, automated [[id number]]  (Normal) Collected: 07/07/21 1428    Lab Status: Final result Specimen: Blood from Arm, Left Updated: 07/07/21 1446     Sed Rate 8 mm/hour     CBC and differential [[id number]]  (Abnormal) Collected: 07/07/21 1428    Lab Status: Final result Specimen: Blood from Arm, Left Updated: 07/07/21 1446     WBC 4 68 Thousand/uL      RBC 5 01 Million/uL      Hemoglobin 12 4 g/dL      Hematocrit 39 4 %      MCV 79 fL      MCH 24 8 pg      MCHC 31 5 g/dL      RDW 13 9 %      MPV 10 3 fL      Platelets 442 Thousands/uL      nRBC 0 /100 WBCs      Neutrophils Relative 62 %      Immat GRANS % 0 %      Lymphocytes Relative 27 %      Monocytes Relative 8 %      Eosinophils Relative 3 %      Basophils Relative 0 %      Neutrophils Absolute 2 89 Thousands/µL      Immature Grans Absolute 0 01 Thousand/uL      Lymphocytes Absolute 1 26 Thousands/µL      Monocytes Absolute 0 38 Thousand/µL      Eosinophils Absolute 0 13 Thousand/µL      Basophils Absolute 0 01 Thousands/µL     POCT pregnancy, urine [[id number]]  (Normal) Resulted: 07/07/21 1415    Lab Status: Final result Updated: 07/07/21 1429     EXT PREG TEST UR (Ref: Negative) negative     Control valid                 CT head without contrast   Final Result by Basilia Cash MD (07/07 1412)      No acute intracranial abnormality  Mild ethmoid sinus mucosal thickening  Workstation performed: [id number]                    Procedures  Procedures         ED Course  ED Course as of Jul 10 1343   Wed Jul 07, 2021   1351 IOP left eye, 11, 10, 10  Normal no elevated IOP                                                  MDM  Number of Diagnoses or Management Options  Ethmoid sinusitis: new and requires workup  Headache: new and requires workup  Diagnosis management comments: MDM:  ddx includes but is not limited to tension headache, migraine headache, cluster headache, sinusitis, SAH, SDH, doubt temporal arteritis due to lack of unilateral temporal location of pain, doubt intracranial hemorrhage, doubt meningitis due to lack of fever/nuchal rigidity  Lab results reviewed  CBC demonstrates normal white blood cell count 4 6, hemoglobin of 12 hematocrit 39 are normal   No anemia  Basic metabolic remarkable for BUN 19 creatinine 0 79 which are normal   No renal failure  Sed rate was normal at 8  Doubt temporal arteritis  Pregnancy test was negative  CT scan of the head images independently visualized interpreted by me  Radiology report reviewed:  FINDINGS:     PARENCHYMA:  No intracranial mass, mass effect or midline shift  No CT signs of acute infarction   No acute parenchymal hemorrhage  VENTRICLES AND EXTRA-AXIAL SPACES:  Normal for the patient's age  VISUALIZED ORBITS AND PARANASAL SINUSES:  Mild ethmoid sinus also thickening   Orbits appear normal      CALVARIUM AND EXTRACRANIAL SOFT TISSUES:  Normal         Amount and/or Complexity of Data Reviewed  Clinical lab tests: ordered and reviewed  Tests in the radiology section of CPT®: ordered and reviewed  Discussion of test results with the performing providers: yes  Review and summarize past medical records: yes  Independent visualization of images, tracings, or specimens: yes    Risk of Complications, Morbidity, and/or Mortality  General comments: ED course:  53-year-old female presents to the emergency department with chief complaint of a headache originating from the left temporal area radiating behind the right eye  No history of headaches in the past   No recent fall head injury or trauma  Patient reports no other neurological symptoms  No blurred vision or diplopia    Normal intra-ocular pressures on bedside testing  Doubt glaucoma  ED workup demonstrates normal white blood cell count, normal sed rate  No evidence of anemia, electrolyte abnormality, renal failure or temporal arteritis on lab evaluation  CT scan of the head shows no acute intracranial abnormalities but findings suggestive of mild ethmoid sinusitis  I discussed all these findings with the patient at bedside  Discussed suspect her symptoms secondary to and mold sinusitis is this is consistent with the distribution and location of her symptoms  Given that her symptoms started this morning no indication for antibiotic treatment  Discussed will treat with antihistamines and decongestants  Discussed follow-up with primary care physician and ENT in 3-5 days for recheck and further evaluation of symptoms  Reviewed reasons to return to ed  Patient verbalized understanding of diagnosis and agreement with discharge plan of care as well as understanding of reasons to return to ed  I have reasonably determine that electronically prescribing a controlled substance would be impractical for the patient to obtain the controlled substance prescribed by electronic prescription or would cause an untimely delay resulting in an adverse impact on the patient's medical condition        Patient was seen during the outbreak of the corona virus epidemic   Resources are limited due to the severity of patient illnesses associated with virus   Testing is also limited at this time   Discussed with patient at the time of this evaluation   Due to the fact that limited resources are available -treatment options are limited        Patient Progress  Patient progress: stable      Disposition  Final diagnoses:   Ethmoid sinusitis   Headache     Time reflects when diagnosis was documented in both MDM as applicable and the Disposition within this note     Time User Action Codes Description Comment    7/7/2021  2:51 PM Marissa Forward Add [J32 2] Ethmoid sinusitis     7/7/2021  2:51 PM Kenn Dove Add [R51 9] Headache       ED Disposition     ED Disposition Condition Date/Time Comment    Discharge Stable Wed Jul 7, 2021  2:51 PM Paulino Dave discharge to home/self care  Follow-up Information     Follow up With Specialties Details Why Contact Info Additional Information    Rigo Becker MD Family Medicine Call in 2 days for further evaluation of symptoms [address] [facility] [address]  Emergency Department Emergency Medicine Go to  If symptoms worsen [address] [place] [facility] [address] Emergency Department, [address] [place], [place], [facility], [address]  Throat Otolaryngology Call in 2 days for further evaluation of sinusitis symptoms [address] [facility] 5 [facility], [address] [facility] [address] [facility], [facility]  [employer], [address]          Discharge Medication List as of 7/7/2021  2:53 PM      START taking these medications    Details   !! fluticasone (FLONASE) 50 mcg/act nasal spray 1 spray into each nostril daily, Starting Wed 7/7/2021, Normal      hydrOXYzine HCL (ATARAX) 25 mg tablet Take 1 tablet (25 mg total) by mouth every 6 (six) hours as needed for allergies, Starting Wed 7/7/2021, Normal       !! - Potential duplicate medications found  Please discuss with provider        CONTINUE these medications which have NOT CHANGED    Details   amitriptyline (ELAVIL) 25 mg tablet Take 25 mg by mouth, Starting Wed 1/6/2021, Historical Med      atorvastatin (LIPITOR) 20 mg tablet Take 10 mg by mouth daily, Historical Med      benzonatate (TESSALON PERLES) 100 mg capsule Take 1 capsule (100 mg total) by mouth every 8 (eight) hours, Starting Sun 3/31/2019, Print      cyclobenzaprine (FLEXERIL) 5 mg tablet 1-2 PO TID PRN, Normal !! fluticasone (FLONASE) 50 mcg/act nasal spray fluticasone propionate 50 mcg/actuation nasal spray,suspension, Historical Med      gabapentin (NEURONTIN) 600 MG tablet Take 600 mg by mouth 3 (three) times a day, Historical Med      loratadine (CLARITIN) 10 mg tablet Take 10 mg by mouth daily, Historical Med      meclizine (ANTIVERT) 25 mg tablet Take 1 tablet (25 mg total) by mouth 3 (three) times a day as needed for dizziness, Starting Tue 5/4/2021, Normal      metFORMIN (GLUCOPHAGE) 850 mg tablet Take 850 mg by mouth 2 (two) times a day with meals, Historical Med      montelukast (SINGULAIR) 10 mg tablet Take 10 mg by mouth daily at bedtime, Historical Med      naproxen (NAPROSYN) 500 mg tablet Take 1 tablet (500 mg total) by mouth 2 (two) times a day with meals, Starting Wed 12/23/2020, Normal      pantoprazole (PROTONIX) 40 mg tablet Take 40 mg by mouth daily, Starting Wed 2/17/2021, Historical Med      TiZANidine (ZANAFLEX) 2 MG capsule Take 2 mg by mouth daily as needed for muscle spasms, Historical Med      traZODone (DESYREL) 100 mg tablet TAKE 1 TABLET NIGHTLY, Historical Med       !! - Potential duplicate medications found  Please discuss with provider  No discharge procedures on file      PDMP Review     None          ED Provider  Electronically Signed by           Vonna Kocher, PA-C  07/10/21 2047

## 2021-07-07 NOTE — ED NOTES
D/c reviewed with pt prior to discharge, medications discussed  Ambulatory off unit with steady gait        Jenifer Hopkins RN  33/73/89 [id number]

## 2021-11-01 ENCOUNTER — APPOINTMENT (EMERGENCY)
Dept: CT IMAGING | Facility: HOSPITAL | Age: 56
End: 2021-11-01
Payer: COMMERCIAL

## 2021-11-01 ENCOUNTER — HOSPITAL ENCOUNTER (EMERGENCY)
Facility: HOSPITAL | Age: 56
Discharge: HOME | End: 2021-11-01
Attending: EMERGENCY MEDICINE | Admitting: EMERGENCY MEDICINE
Payer: COMMERCIAL

## 2021-11-01 VITALS
DIASTOLIC BLOOD PRESSURE: 76 MMHG | HEART RATE: 91 BPM | RESPIRATION RATE: 18 BRPM | SYSTOLIC BLOOD PRESSURE: 130 MMHG | OXYGEN SATURATION: 98 % | TEMPERATURE: 98.3 F

## 2021-11-01 DIAGNOSIS — R10.9 FLANK PAIN: Primary | ICD-10-CM

## 2021-11-01 LAB
ALBUMIN SERPL BCP-MCNC: 4.8 G/DL (ref 3.5–5)
ALP SERPL-CCNC: 84 U/L (ref 46–116)
ALT SERPL W P-5'-P-CCNC: 29 U/L (ref 12–78)
ANION GAP SERPL CALCULATED.3IONS-SCNC: 10 MMOL/L (ref 4–13)
AST SERPL W P-5'-P-CCNC: 31 U/L (ref 5–45)
BACTERIA UR QL AUTO: NORMAL /HPF
BASOPHILS # BLD AUTO: 0.02 THOUSANDS/ΜL (ref 0–0.1)
BASOPHILS NFR BLD AUTO: 0 % (ref 0–1)
BILIRUB SERPL-MCNC: 0.72 MG/DL (ref 0.2–1)
BILIRUB UR QL STRIP: NEGATIVE
BUN SERPL-MCNC: 25 MG/DL (ref 5–25)
CALCIUM SERPL-MCNC: 9 MG/DL (ref 8.3–10.1)
CHLORIDE SERPL-SCNC: 101 MMOL/L (ref 100–108)
CLARITY UR: CLEAR
CO2 SERPL-SCNC: 25 MMOL/L (ref 21–32)
COLOR UR: YELLOW
CREAT SERPL-MCNC: 0.84 MG/DL (ref 0.6–1.3)
EOSINOPHIL # BLD AUTO: 0.18 THOUSAND/ΜL (ref 0–0.61)
EOSINOPHIL NFR BLD AUTO: 3 % (ref 0–6)
ERYTHROCYTE [DISTWIDTH] IN BLOOD BY AUTOMATED COUNT: 13.3 % (ref 11.6–15.1)
GFR SERPL CREATININE-BSD FRML MDRD: 79 ML/MIN/1.73SQ M
GLUCOSE SERPL-MCNC: 100 MG/DL (ref 65–140)
GLUCOSE UR STRIP-MCNC: ABNORMAL MG/DL
HCT VFR BLD AUTO: 39.6 % (ref 34.8–46.1)
HGB BLD-MCNC: 12.8 G/DL (ref 11.5–15.4)
HGB UR QL STRIP.AUTO: NEGATIVE
IMM GRANULOCYTES # BLD AUTO: 0 THOUSAND/UL (ref 0–0.2)
IMM GRANULOCYTES NFR BLD AUTO: 0 % (ref 0–2)
KETONES UR STRIP-MCNC: NEGATIVE MG/DL
LEUKOCYTE ESTERASE UR QL STRIP: ABNORMAL
LYMPHOCYTES # BLD AUTO: 1.61 THOUSANDS/ΜL (ref 0.6–4.47)
LYMPHOCYTES NFR BLD AUTO: 31 % (ref 14–44)
MCH RBC QN AUTO: 26 PG (ref 26.8–34.3)
MCHC RBC AUTO-ENTMCNC: 32.3 G/DL (ref 31.4–37.4)
MCV RBC AUTO: 80 FL (ref 82–98)
MONOCYTES # BLD AUTO: 0.43 THOUSAND/ΜL (ref 0.17–1.22)
MONOCYTES NFR BLD AUTO: 8 % (ref 4–12)
NEUTROPHILS # BLD AUTO: 3.02 THOUSANDS/ΜL (ref 1.85–7.62)
NEUTS SEG NFR BLD AUTO: 58 % (ref 43–75)
NITRITE UR QL STRIP: NEGATIVE
NON-SQ EPI CELLS URNS QL MICRO: NORMAL /HPF
NRBC BLD AUTO-RTO: 0 /100 WBCS
PH UR STRIP.AUTO: 5 [PH] (ref 4.5–8)
PLATELET # BLD AUTO: 256 THOUSANDS/UL (ref 149–390)
PMV BLD AUTO: 10.2 FL (ref 8.9–12.7)
POTASSIUM SERPL-SCNC: 5 MMOL/L (ref 3.5–5.3)
PROT SERPL-MCNC: 7.9 G/DL (ref 6.4–8.2)
PROT UR STRIP-MCNC: NEGATIVE MG/DL
RBC # BLD AUTO: 4.93 MILLION/UL (ref 3.81–5.12)
RBC #/AREA URNS AUTO: NORMAL /HPF
SODIUM SERPL-SCNC: 136 MMOL/L (ref 136–145)
SP GR UR STRIP.AUTO: 1.02 (ref 1–1.03)
UROBILINOGEN UR QL STRIP.AUTO: 0.2 E.U./DL
WBC # BLD AUTO: 5.26 THOUSAND/UL (ref 4.31–10.16)
WBC #/AREA URNS AUTO: NORMAL /HPF

## 2021-11-01 PROCEDURE — 87086 URINE CULTURE/COLONY COUNT: CPT | Performed by: PHYSICIAN ASSISTANT

## 2021-11-01 PROCEDURE — 74177 CT ABD & PELVIS W/CONTRAST: CPT

## 2021-11-01 PROCEDURE — 81001 URINALYSIS AUTO W/SCOPE: CPT

## 2021-11-01 PROCEDURE — 99284 EMERGENCY DEPT VISIT MOD MDM: CPT | Performed by: PHYSICIAN ASSISTANT

## 2021-11-01 PROCEDURE — 36415 COLL VENOUS BLD VENIPUNCTURE: CPT | Performed by: PHYSICIAN ASSISTANT

## 2021-11-01 PROCEDURE — 96374 THER/PROPH/DIAG INJ IV PUSH: CPT

## 2021-11-01 PROCEDURE — 96361 HYDRATE IV INFUSION ADD-ON: CPT

## 2021-11-01 PROCEDURE — G1004 CDSM NDSC: HCPCS

## 2021-11-01 PROCEDURE — 99284 EMERGENCY DEPT VISIT MOD MDM: CPT

## 2021-11-01 PROCEDURE — 85025 COMPLETE CBC W/AUTO DIFF WBC: CPT | Performed by: PHYSICIAN ASSISTANT

## 2021-11-01 PROCEDURE — 80053 COMPREHEN METABOLIC PANEL: CPT | Performed by: PHYSICIAN ASSISTANT

## 2021-11-01 PROCEDURE — 96375 TX/PRO/DX INJ NEW DRUG ADDON: CPT

## 2021-11-01 RX ORDER — NAPROXEN 375 MG/1
375 TABLET ORAL 2 TIMES DAILY WITH MEALS
Qty: 14 TABLET | Refills: 0 | Status: SHIPPED | OUTPATIENT
Start: 2021-11-01 | End: 2022-03-29

## 2021-11-01 RX ORDER — MORPHINE SULFATE 4 MG/ML
4 INJECTION, SOLUTION INTRAMUSCULAR; INTRAVENOUS ONCE
Status: COMPLETED | OUTPATIENT
Start: 2021-11-01 | End: 2021-11-01

## 2021-11-01 RX ORDER — KETOROLAC TROMETHAMINE 30 MG/ML
15 INJECTION, SOLUTION INTRAMUSCULAR; INTRAVENOUS ONCE
Status: COMPLETED | OUTPATIENT
Start: 2021-11-01 | End: 2021-11-01

## 2021-11-01 RX ORDER — ONDANSETRON 2 MG/ML
4 INJECTION INTRAMUSCULAR; INTRAVENOUS ONCE
Status: COMPLETED | OUTPATIENT
Start: 2021-11-01 | End: 2021-11-01

## 2021-11-01 RX ORDER — METHOCARBAMOL 500 MG/1
500 TABLET, FILM COATED ORAL 2 TIMES DAILY
Qty: 20 TABLET | Refills: 0 | Status: SHIPPED | OUTPATIENT
Start: 2021-11-01 | End: 2022-03-29

## 2021-11-01 RX ADMIN — ONDANSETRON 4 MG: 2 INJECTION INTRAMUSCULAR; INTRAVENOUS at 12:07

## 2021-11-01 RX ADMIN — IOHEXOL 100 ML: 350 INJECTION, SOLUTION INTRAVENOUS at 13:07

## 2021-11-01 RX ADMIN — MORPHINE SULFATE 4 MG: 4 INJECTION INTRAVENOUS at 12:43

## 2021-11-01 RX ADMIN — SODIUM CHLORIDE 1000 ML: 0.9 INJECTION, SOLUTION INTRAVENOUS at 12:07

## 2021-11-01 RX ADMIN — KETOROLAC TROMETHAMINE 15 MG: 30 INJECTION, SOLUTION INTRAMUSCULAR at 12:07

## 2021-11-02 LAB — BACTERIA UR CULT: NORMAL

## 2021-12-12 ENCOUNTER — NURSE TRIAGE (OUTPATIENT)
Dept: OTHER | Facility: OTHER | Age: 56
End: 2021-12-12

## 2022-01-30 ENCOUNTER — OFFICE VISIT (OUTPATIENT)
Dept: URGENT CARE | Facility: CLINIC | Age: 57
End: 2022-01-30
Payer: COMMERCIAL

## 2022-01-30 VITALS
WEIGHT: 200 LBS | RESPIRATION RATE: 18 BRPM | HEART RATE: 110 BPM | TEMPERATURE: 97.8 F | HEIGHT: 66 IN | OXYGEN SATURATION: 97 % | BODY MASS INDEX: 32.14 KG/M2

## 2022-01-30 DIAGNOSIS — H66.93 BILATERAL OTITIS MEDIA, UNSPECIFIED OTITIS MEDIA TYPE: ICD-10-CM

## 2022-01-30 DIAGNOSIS — B34.9 VIRAL SYNDROME: Primary | ICD-10-CM

## 2022-01-30 PROCEDURE — 87636 SARSCOV2 & INF A&B AMP PRB: CPT | Performed by: PHYSICIAN ASSISTANT

## 2022-01-30 PROCEDURE — 99213 OFFICE O/P EST LOW 20 MIN: CPT | Performed by: PHYSICIAN ASSISTANT

## 2022-01-30 RX ORDER — BROMPHENIRAMINE MALEATE, PSEUDOEPHEDRINE HYDROCHLORIDE, AND DEXTROMETHORPHAN HYDROBROMIDE 2; 30; 10 MG/5ML; MG/5ML; MG/5ML
5 SYRUP ORAL 4 TIMES DAILY PRN
Qty: 120 ML | Refills: 0 | Status: SHIPPED | OUTPATIENT
Start: 2022-01-30 | End: 2022-03-29

## 2022-01-30 RX ORDER — LEVOCETIRIZINE DIHYDROCHLORIDE 5 MG/1
5 TABLET, FILM COATED ORAL
COMMUNITY
Start: 2021-10-14 | End: 2022-10-14

## 2022-01-30 RX ORDER — AMOXICILLIN 875 MG/1
875 TABLET, COATED ORAL 2 TIMES DAILY
Qty: 14 TABLET | Refills: 0 | Status: SHIPPED | OUTPATIENT
Start: 2022-01-30 | End: 2022-02-06

## 2022-01-30 RX ORDER — PREDNISONE 10 MG/1
40 TABLET ORAL DAILY
Qty: 16 TABLET | Refills: 0 | Status: SHIPPED | OUTPATIENT
Start: 2022-01-30 | End: 2022-02-03

## 2022-01-30 RX ORDER — LISINOPRIL 2.5 MG/1
2.5 TABLET ORAL DAILY
COMMUNITY
Start: 2021-12-28

## 2022-01-30 NOTE — PATIENT INSTRUCTIONS
DO NOT take the Bromfed with the Atarax  Bromfed is also sedating   Prednisone for the asthma hx and antibiotic 2x a day for 7 days for the ear infection     COVID instructions provided to patient and patient instructed to self isolate at home until swab returns  Will follow up when COVID swab is available, Recommend to check [address] for quickest access to results  Recommend taking Vitamins such as D3, C and Zinc OTC  Vitamin D3 2000 IU once a day  Vitamin C 1g by mouth twice a day 12 hours apart  It is recommended to wear a mask while in public or within 6 feet from others, proper hand washing and hygiene  If you are feeling unwell, we recommend to self-isolate at home and stay away from household contacts until well again  If you develop any chest pain, chest pain with deep breathing, shortness of breath, or trouble breathing go to the ER

## 2022-01-30 NOTE — LETTER
January 30, 2022     Patient: Jose Car   YOB: 1965   Date of Visit: 1/30/2022       To Whom it May Concern:    Nikki Kitshayy is under my professional care  She was seen in my office on 1/30/2022  She may return to work on 2/3/2022  If you have any questions or concerns, please don't hesitate to call           Sincerely,          Monica Wilder PA-C        CC: No Recipients

## 2022-01-30 NOTE — PROGRESS NOTES
[address]        NAME: Malu Reyes is a 49 y o  female  : 1965    MRN: [id number]  DATE: 2022  TIME: 9:57 AM    Assessment and Plan   Viral syndrome [B34 9]  1  Viral syndrome  Covid/Flu-Office Collect    predniSONE 10 mg tablet    brompheniramine-pseudoephedrine-DM 30-2-10 MG/5ML syrup    CANCELED: COVID Only -Office Collect   2  Bilateral otitis media, unspecified otitis media type  amoxicillin (AMOXIL) 875 mg tablet         Patient Instructions   Patient Instructions   DO NOT take the Bromfed with the Atarax  Bromfed is also sedating   Prednisone for the asthma hx and antibiotic 2x a day for 7 days for the ear infection     COVID instructions provided to patient and patient instructed to self isolate at home until swab returns  Will follow up when COVID swab is available, Recommend to check [facility] for quickest access to results  Recommend taking Vitamins such as D3, C and Zinc OTC  Vitamin D3 2000 IU once a day  Vitamin C 1g by mouth twice a day 12 hours apart  It is recommended to wear a mask while in public or within 6 feet from others, proper hand washing and hygiene  If you are feeling unwell, we recommend to self-isolate at home and stay away from household contacts until well again  If you develop any chest pain, chest pain with deep breathing, shortness of breath, or trouble breathing go to the ER  Follow up with PCP in 3-5 days  Proceed to  ER if symptoms worsen  Chief Complaint     Chief Complaint   Patient presents with    Cold Like Symptoms     woke up this morning feeling foggy, cant think clearly, diarrhea x a few days, nasal congestion, wants her ears checked  Home COVID test this morning was negative  History of Present Illness       The pt is a 65-year-old female presenting for chest congestion, headache and myalgias x 2 days  She also reports diarrhea and a slight cough/congestion but does have a hx of asthma  Review of Systems   Review of Systems   Constitutional: Negative for activity change, appetite change, chills, fatigue and fever  HENT: Positive for congestion and ear pain  Negative for rhinorrhea, sinus pressure, sinus pain and sore throat  Respiratory: Positive for cough  Negative for chest tightness and shortness of breath  Cardiovascular: Negative for chest pain and palpitations  Gastrointestinal: Positive for diarrhea  Negative for nausea and vomiting  Musculoskeletal: Positive for myalgias  Negative for arthralgias  Skin: Negative for color change and pallor  Neurological: Positive for headaches           Current Medications       Current Outpatient Medications:     amitriptyline (ELAVIL) 25 mg tablet, Take 25 mg by mouth, Disp: , Rfl:     atorvastatin (LIPITOR) 20 mg tablet, Take 10 mg by mouth daily, Disp: , Rfl:     fluticasone (FLONASE) 50 mcg/act nasal spray, fluticasone propionate 50 mcg/actuation nasal spray,suspension, Disp: , Rfl:     gabapentin (NEURONTIN) 600 MG tablet, Take 600 mg by mouth 3 (three) times a day, Disp: , Rfl:     hydrOXYzine HCL (ATARAX) 25 mg tablet, Take 1 tablet (25 mg total) by mouth every 6 (six) hours as needed for allergies, Disp: 20 tablet, Rfl: 0    levocetirizine (XYZAL) 5 MG tablet, Take 5 mg by mouth, Disp: , Rfl:     lisinopril (ZESTRIL) 2 5 mg tablet, , Disp: , Rfl:     amoxicillin (AMOXIL) 875 mg tablet, Take 1 tablet (875 mg total) by mouth 2 (two) times a day for 7 days, Disp: 14 tablet, Rfl: 0    benzonatate (TESSALON PERLES) 100 mg capsule, Take 1 capsule (100 mg total) by mouth every 8 (eight) hours (Patient not taking: Reported on 5/4/2021), Disp: 21 capsule, Rfl: 0    brompheniramine-pseudoephedrine-DM 30-2-10 MG/5ML syrup, Take 5 mL by mouth 4 (four) times a day as needed for allergies, Disp: 120 mL, Rfl: 0    cyclobenzaprine (FLEXERIL) 5 mg tablet, 1-2 PO TID PRN (Patient not taking: Reported on 5/4/2021), Disp: 12 tablet, Rfl: 0    fluticasone (FLONASE) 50 mcg/act nasal spray, 1 spray into each nostril daily (Patient not taking: Reported on 1/30/2022 ), Disp: 16 g, Rfl: 0    loratadine (CLARITIN) 10 mg tablet, Take 10 mg by mouth daily (Patient not taking: Reported on 1/30/2022 ), Disp: , Rfl:     meclizine (ANTIVERT) 25 mg tablet, Take 1 tablet (25 mg total) by mouth 3 (three) times a day as needed for dizziness, Disp: 30 tablet, Rfl: 0    metFORMIN (GLUCOPHAGE) 850 mg tablet, Take 850 mg by mouth 2 (two) times a day with meals (Patient not taking: Reported on 1/30/2022 ), Disp: , Rfl:     methocarbamol (ROBAXIN) 500 mg tablet, Take 1 tablet (500 mg total) by mouth 2 (two) times a day (Patient not taking: Reported on 1/30/2022 ), Disp: 20 tablet, Rfl: 0    montelukast (SINGULAIR) 10 mg tablet, Take 10 mg by mouth daily at bedtime (Patient not taking: Reported on 1/30/2022 ), Disp: , Rfl:     naproxen (NAPROSYN) 375 mg tablet, Take 1 tablet (375 mg total) by mouth 2 (two) times a day with meals (Patient not taking: Reported on 1/30/2022 ), Disp: 14 tablet, Rfl: 0    naproxen (NAPROSYN) 500 mg tablet, Take 1 tablet (500 mg total) by mouth 2 (two) times a day with meals (Patient not taking: Reported on 5/4/2021), Disp: 60 tablet, Rfl: 0    pantoprazole (PROTONIX) 40 mg tablet, Take 40 mg by mouth daily (Patient not taking: Reported on 1/30/2022 ), Disp: , Rfl:     predniSONE 10 mg tablet, Take 4 tablets (40 mg total) by mouth daily for 4 days, Disp: 16 tablet, Rfl: 0    TiZANidine (ZANAFLEX) 2 MG capsule, Take 2 mg by mouth daily as needed for muscle spasms (Patient not taking: Reported on 1/30/2022 ), Disp: , Rfl:     traZODone (DESYREL) 100 mg tablet, TAKE 1 TABLET NIGHTLY (Patient not taking: Reported on 1/30/2022), Disp: , Rfl:     Current Allergies     Allergies as of 01/30/2022 - Reviewed 01/30/2022   Allergen Reaction Noted    Latex  01/13/2020            The following portions of the patient's history were reviewed and updated as appropriate: allergies, current medications, past family history, past medical history, past social history, past surgical history and problem list      Past Medical History:   Diagnosis Date    Asthma     Fibromyalgia        Past Surgical History:   Procedure Laterality Date    ANKLE SURGERY Left     CARPAL TUNNEL RELEASE      CARPAL TUNNEL RELEASE      TUBAL LIGATION         History reviewed  No pertinent family history  Medications have been verified  Objective   Pulse (!) 110   Temp 97 8 °F (36 6 °C) (Temporal)   Resp 18   Ht 5' 6" (1 676 m)   Wt 90 7 kg (200 lb)   SpO2 97%   BMI 32 28 kg/m²        Physical Exam     Physical Exam  Vitals and nursing note reviewed  Constitutional:       General: She is not in acute distress  Appearance: Normal appearance  She is normal weight  She is not ill-appearing, toxic-appearing or diaphoretic  HENT:      Head: Normocephalic and atraumatic  Right Ear: Ear canal and external ear normal  There is no impacted cerumen  Left Ear: Ear canal and external ear normal  There is no impacted cerumen  Ears:      Comments: B/ injection of TM     Nose: Nose normal  No congestion or rhinorrhea  Mouth/Throat:      Mouth: Mucous membranes are moist       Pharynx: Oropharynx is clear  No oropharyngeal exudate or posterior oropharyngeal erythema  Eyes:      General: No scleral icterus  Right eye: No discharge  Left eye: No discharge  Extraocular Movements: Extraocular movements intact  Conjunctiva/sclera: Conjunctivae normal       Pupils: Pupils are equal, round, and reactive to light  Cardiovascular:      Rate and Rhythm: Normal rate and regular rhythm  Heart sounds: Normal heart sounds  No murmur heard  No friction rub  No gallop  Pulmonary:      Effort: Pulmonary effort is normal  No respiratory distress  Breath sounds: Normal breath sounds  No stridor   No wheezing, rhonchi or rales    Chest:      Chest wall: No tenderness  Abdominal:      General: Abdomen is flat  Bowel sounds are normal  There is no distension  Palpations: Abdomen is soft  There is no mass  Tenderness: There is no abdominal tenderness  There is no right CVA tenderness, left CVA tenderness, guarding or rebound  Hernia: No hernia is present  Musculoskeletal:         General: Normal range of motion  Skin:     General: Skin is warm and dry  Capillary Refill: Capillary refill takes less than 2 seconds  Neurological:      Mental Status: She is alert

## 2022-01-31 LAB
FLUAV RNA RESP QL NAA+PROBE: NEGATIVE
FLUBV RNA RESP QL NAA+PROBE: NEGATIVE
SARS-COV-2 RNA RESP QL NAA+PROBE: NEGATIVE

## 2022-02-06 ENCOUNTER — OFFICE VISIT (OUTPATIENT)
Dept: URGENT CARE | Facility: CLINIC | Age: 57
End: 2022-02-06
Payer: COMMERCIAL

## 2022-02-06 VITALS
WEIGHT: 200 LBS | HEIGHT: 67 IN | BODY MASS INDEX: 31.39 KG/M2 | HEART RATE: 98 BPM | RESPIRATION RATE: 18 BRPM | OXYGEN SATURATION: 96 % | TEMPERATURE: 98.9 F

## 2022-02-06 DIAGNOSIS — J20.9 ACUTE BRONCHITIS, UNSPECIFIED ORGANISM: Primary | ICD-10-CM

## 2022-02-06 PROCEDURE — 99213 OFFICE O/P EST LOW 20 MIN: CPT | Performed by: EMERGENCY MEDICINE

## 2022-02-06 RX ORDER — BENZONATATE 100 MG/1
100 CAPSULE ORAL 3 TIMES DAILY PRN
Qty: 30 CAPSULE | Refills: 0 | Status: SHIPPED | OUTPATIENT
Start: 2022-02-06 | End: 2022-02-16

## 2022-02-06 RX ORDER — AZITHROMYCIN 250 MG/1
TABLET, FILM COATED ORAL
Qty: 6 TABLET | Refills: 0 | Status: SHIPPED | OUTPATIENT
Start: 2022-02-06 | End: 2022-02-10

## 2022-02-06 NOTE — PATIENT INSTRUCTIONS
1   F/u with your PCP in 5-7 days        Acute Bronchitis   WHAT YOU NEED TO KNOW:   Acute bronchitis is swelling and irritation in your lungs  It is usually caused by a virus and most often happens in the winter  Bronchitis may also be caused by bacteria or by a chemical irritant, such as smoke  DISCHARGE INSTRUCTIONS:   Return to the emergency department if:   · You cough up blood  · Your lips or fingernails turn blue  · You feel like you are not getting enough air when you breathe  Call your doctor if:   · Your symptoms do not go away or get worse, even after treatment  · Your cough does not get better within 4 weeks  · You have questions or concerns about your condition or care  Medicines: You may  need any of the following:  · Cough suppressants  decrease your urge to cough  · Decongestants  help loosen mucus in your lungs and make it easier to cough up  This can help you breathe easier  · Inhalers  may be given  Your healthcare provider may give you one or more inhalers to help you breathe easier and cough less  An inhaler gives your medicine to open your airways  Ask your healthcare provider to show you how to use your inhaler correctly  · Antibiotics  may be given for up to 5 days if your bronchitis is caused by bacteria  · Acetaminophen  decreases pain and fever  It is available without a doctor's order  Ask how much to take and how often to take it  Follow directions  Read the labels of all other medicines you are using to see if they also contain acetaminophen, or ask your doctor or pharmacist  Acetaminophen can cause liver damage if not taken correctly  Do not use more than 4 grams (4,000 milligrams) total of acetaminophen in one day  · NSAIDs  help decrease swelling and pain or fever  This medicine is available with or without a doctor's order  NSAIDs can cause stomach bleeding or kidney problems in certain people   If you take blood thinner medicine, always ask your healthcare provider if NSAIDs are safe for you  Always read the medicine label and follow directions  · Take your medicine as directed  Contact your healthcare provider if you think your medicine is not helping or if you have side effects  Tell him of her if you are allergic to any medicine  Keep a list of the medicines, vitamins, and herbs you take  Include the amounts, and when and why you take them  Bring the list or the pill bottles to follow-up visits  Carry your medicine list with you in case of an emergency  Self-care:   · Drink liquids as directed  You may need to drink more liquids than usual to stay hydrated  Ask how much liquid to drink each day and which liquids are best for you  · Use a cool mist humidifier  to increase air moisture in your home  This may make it easier for you to breathe and help decrease your cough  · Get more rest   Rest helps your body to heal  Slowly start to do more each day  Rest when you feel it is needed  · Avoid irritants in the air  Avoid chemicals, fumes, and dust  Wear a face mask if you must work around dust or fumes  Stay inside on days when air pollution levels are high  If you have allergies, stay inside when pollen counts are high  Do not use aerosol products, such as spray-on deodorant, bug spray, and hair spray  · Do not smoke or be around others who are smoking  Nicotine and other chemicals in cigarettes and cigars can cause lung damage  Ask your healthcare provider for information if you currently smoke and need help to quit  E-cigarettes or smokeless tobacco still contain nicotine  Talk to your healthcare provider before you use these products  Prevent acute bronchitis:       · Ask about vaccines you may need  Get a flu vaccine each year as soon as recommended, usually in September or October  Ask your healthcare provider if you should also get a pneumonia or COVID-19 vaccine   Your healthcare provider can tell you if you should also get other vaccines, and when to get them  · Prevent the spread of germs  You can decrease your risk for acute bronchitis and other illnesses by doing the following:     ? Wash your hands often with soap and water  Carry germ-killing hand lotion or gel with you  You can use the lotion or gel to clean your hands when soap and water are not available  ? Do not touch your eyes, nose, or mouth unless you have washed your hands first     ? Always cover your mouth when you cough to prevent the spread of germs  It is best to cough into a tissue or your shirt sleeve instead of into your hand  Ask those around you to cover their mouths when they cough  ? Try to avoid people who have a cold or the flu  If you are sick, stay away from others as much as possible  Follow up with your doctor as directed:  Write down questions you have so you will remember to ask them during your follow-up visits  © Copyright [employer] 2021 Information is for End User's use only and may not be sold, redistributed or otherwise used for commercial purposes  All illustrations and images included in CareNotes® are the copyrighted property of [employer]  or [employer]   The above information is an  only  It is not intended as medical advice for individual conditions or treatments  Talk to your doctor, nurse or pharmacist before following any medical regimen to see if it is safe and effective for you

## 2022-02-06 NOTE — PROGRESS NOTES
[facility]        NAME: Lorinda Apgar is a 49 y o  female  : 1965    MRN: [id number]  DATE: 2022  TIME: 9:29 AM    Assessment and Plan   Acute bronchitis, unspecified organism [J20 [id number]]  1  Acute bronchitis, unspecified organism  azithromycin (ZITHROMAX) 250 mg tablet    benzonatate (TESSALON PERLES) 100 mg capsule         Patient Instructions     Patient Instructions     1  F/u with your PCP in 5-7 days        Acute Bronchitis   WHAT YOU NEED TO KNOW:   Acute bronchitis is swelling and irritation in your lungs  It is usually caused by a virus and most often happens in the winter  Bronchitis may also be caused by bacteria or by a chemical irritant, such as smoke  DISCHARGE INSTRUCTIONS:   Return to the emergency department if:   · You cough up blood  · Your lips or fingernails turn blue  · You feel like you are not getting enough air when you breathe  Call your doctor if:   · Your symptoms do not go away or get worse, even after treatment  · Your cough does not get better within 4 weeks  · You have questions or concerns about your condition or care  Medicines: You may  need any of the following:  · Cough suppressants  decrease your urge to cough  · Decongestants  help loosen mucus in your lungs and make it easier to cough up  This can help you breathe easier  · Inhalers  may be given  Your healthcare provider may give you one or more inhalers to help you breathe easier and cough less  An inhaler gives your medicine to open your airways  Ask your healthcare provider to show you how to use your inhaler correctly  · Antibiotics  may be given for up to 5 days if your bronchitis is caused by bacteria  · Acetaminophen  decreases pain and fever  It is available without a doctor's order  Ask how much to take and how often to take it  Follow directions   Read the labels of all other medicines you are using to see if they also contain acetaminophen, or ask your doctor or pharmacist  Acetaminophen can cause liver damage if not taken correctly  Do not use more than 4 grams (4,000 milligrams) total of acetaminophen in one day  · NSAIDs  help decrease swelling and pain or fever  This medicine is available with or without a doctor's order  NSAIDs can cause stomach bleeding or kidney problems in certain people  If you take blood thinner medicine, always ask your healthcare provider if NSAIDs are safe for you  Always read the medicine label and follow directions  · Take your medicine as directed  Contact your healthcare provider if you think your medicine is not helping or if you have side effects  Tell him of her if you are allergic to any medicine  Keep a list of the medicines, vitamins, and herbs you take  Include the amounts, and when and why you take them  Bring the list or the pill bottles to follow-up visits  Carry your medicine list with you in case of an emergency  Self-care:   · Drink liquids as directed  You may need to drink more liquids than usual to stay hydrated  Ask how much liquid to drink each day and which liquids are best for you  · Use a cool mist humidifier  to increase air moisture in your home  This may make it easier for you to breathe and help decrease your cough  · Get more rest   Rest helps your body to heal  Slowly start to do more each day  Rest when you feel it is needed  · Avoid irritants in the air  Avoid chemicals, fumes, and dust  Wear a face mask if you must work around dust or fumes  Stay inside on days when air pollution levels are high  If you have allergies, stay inside when pollen counts are high  Do not use aerosol products, such as spray-on deodorant, bug spray, and hair spray  · Do not smoke or be around others who are smoking  Nicotine and other chemicals in cigarettes and cigars can cause lung damage  Ask your healthcare provider for information if you currently smoke and need help to quit   E-cigarettes or smokeless tobacco still contain nicotine  Talk to your healthcare provider before you use these products  Prevent acute bronchitis:       · Ask about vaccines you may need  Get a flu vaccine each year as soon as recommended, usually in September or October  Ask your healthcare provider if you should also get a pneumonia or COVID-19 vaccine  Your healthcare provider can tell you if you should also get other vaccines, and when to get them  · Prevent the spread of germs  You can decrease your risk for acute bronchitis and other illnesses by doing the following:     ? Wash your hands often with soap and water  Carry germ-killing hand lotion or gel with you  You can use the lotion or gel to clean your hands when soap and water are not available  ? Do not touch your eyes, nose, or mouth unless you have washed your hands first     ? Always cover your mouth when you cough to prevent the spread of germs  It is best to cough into a tissue or your shirt sleeve instead of into your hand  Ask those around you to cover their mouths when they cough  ? Try to avoid people who have a cold or the flu  If you are sick, stay away from others as much as possible  Follow up with your doctor as directed:  Write down questions you have so you will remember to ask them during your follow-up visits  © Copyright [employer] 2021 Information is for End User's use only and may not be sold, redistributed or otherwise used for commercial purposes  All illustrations and images included in CareNotes® are the copyrighted property of [employer]  or [employer]   The above information is an  only  It is not intended as medical advice for individual conditions or treatments  Talk to your doctor, nurse or pharmacist before following any medical regimen to see if it is safe and effective for you  Pt   Was instructed to use the Tessalon pearles first   If no improvement with these, then would take the Aziithromycin  Follow up with PCP in 3-5 days  Proceed to  ER if symptoms worsen  Chief Complaint     Chief Complaint   Patient presents with   Page Sánchez     pt states she was seen 1 week ago, needs a note to return to work  also states she is still having chest congestion  History of Present Illness       65-year-old white female with chief complaint of persistent chest congestion  Patient states that she was treated for an ear infection last week and she was out of work for the week and she needs a note to return 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  Patient states that her ears feel better  Patient was tested for COVID and was negative  Patient quit smoking 27 years ago  Review of Systems   Review of Systems   Constitutional: Negative for chills and fever  HENT: Negative for congestion and rhinorrhea  Eyes: Negative for discharge and visual disturbance  Respiratory: Positive for cough  Negative for shortness of breath and wheezing  Cardiovascular: Negative for chest pain and palpitations  Gastrointestinal: Negative for abdominal pain and vomiting  Endocrine: Negative for polydipsia and polyuria  Genitourinary: Negative for dysuria and hematuria  Musculoskeletal: Negative for arthralgias, gait problem and neck stiffness  Skin: Negative for rash and wound  Neurological: Negative for dizziness and headaches  Psychiatric/Behavioral: Negative for confusion and suicidal ideas           Current Medications       Current Outpatient Medications:     amitriptyline (ELAVIL) 25 mg tablet, Take 25 mg by mouth, Disp: , Rfl:     amoxicillin (AMOXIL) 875 mg tablet, Take 1 tablet (875 mg total) by mouth 2 (two) times a day for 7 days, Disp: 14 tablet, Rfl: 0    atorvastatin (LIPITOR) 20 mg tablet, Take 10 mg by mouth daily, Disp: , Rfl:     azithromycin (ZITHROMAX) 250 mg tablet, Take 2 tablets today then 1 tablet daily x 4 days, Disp: 6 tablet, Rfl: 0    benzonatate (TESSALON PERLES) 100 mg capsule, Take 1 capsule (100 mg total) by mouth every 8 (eight) hours (Patient not taking: Reported on 5/4/2021), Disp: 21 capsule, Rfl: 0    benzonatate (TESSALON PERLES) 100 mg capsule, Take 1 capsule (100 mg total) by mouth 3 (three) times a day as needed for cough for up to 10 days, Disp: 30 capsule, Rfl: 0    brompheniramine-pseudoephedrine-DM 30-2-10 MG/5ML syrup, Take 5 mL by mouth 4 (four) times a day as needed for allergies, Disp: 120 mL, Rfl: 0    cyclobenzaprine (FLEXERIL) 5 mg tablet, 1-2 PO TID PRN (Patient not taking: Reported on 5/4/2021), Disp: 12 tablet, Rfl: 0    fluticasone (FLONASE) 50 mcg/act nasal spray, fluticasone propionate 50 mcg/actuation nasal spray,suspension, Disp: , Rfl:     fluticasone (FLONASE) 50 mcg/act nasal spray, 1 spray into each nostril daily (Patient not taking: Reported on 1/30/2022 ), Disp: 16 g, Rfl: 0    gabapentin (NEURONTIN) 600 MG tablet, Take 600 mg by mouth 3 (three) times a day, Disp: , Rfl:     hydrOXYzine HCL (ATARAX) 25 mg tablet, Take 1 tablet (25 mg total) by mouth every 6 (six) hours as needed for allergies, Disp: 20 tablet, Rfl: 0    levocetirizine (XYZAL) 5 MG tablet, Take 5 mg by mouth, Disp: , Rfl:     lisinopril (ZESTRIL) 2 5 mg tablet, , Disp: , Rfl:     loratadine (CLARITIN) 10 mg tablet, Take 10 mg by mouth daily (Patient not taking: Reported on 1/30/2022 ), Disp: , Rfl:     meclizine (ANTIVERT) 25 mg tablet, Take 1 tablet (25 mg total) by mouth 3 (three) times a day as needed for dizziness, Disp: 30 tablet, Rfl: 0    metFORMIN (GLUCOPHAGE) 850 mg tablet, Take 850 mg by mouth 2 (two) times a day with meals (Patient not taking: Reported on 1/30/2022 ), Disp: , Rfl:     methocarbamol (ROBAXIN) 500 mg tablet, Take 1 tablet (500 mg total) by mouth 2 (two) times a day (Patient not taking: Reported on 1/30/2022 ), Disp: 20 tablet, Rfl: 0    montelukast (SINGULAIR) 10 mg tablet, Take 10 mg by mouth daily at bedtime (Patient not taking: Reported on 1/30/2022 ), Disp: , Rfl:     naproxen (NAPROSYN) 375 mg tablet, Take 1 tablet (375 mg total) by mouth 2 (two) times a day with meals (Patient not taking: Reported on 1/30/2022 ), Disp: 14 tablet, Rfl: 0    naproxen (NAPROSYN) 500 mg tablet, Take 1 tablet (500 mg total) by mouth 2 (two) times a day with meals (Patient not taking: Reported on 5/4/2021), Disp: 60 tablet, Rfl: 0    pantoprazole (PROTONIX) 40 mg tablet, Take 40 mg by mouth daily (Patient not taking: Reported on 1/30/2022 ), Disp: , Rfl:     TiZANidine (ZANAFLEX) 2 MG capsule, Take 2 mg by mouth daily as needed for muscle spasms (Patient not taking: Reported on 1/30/2022 ), Disp: , Rfl:     traZODone (DESYREL) 100 mg tablet, TAKE 1 TABLET NIGHTLY (Patient not taking: Reported on 1/30/2022), Disp: , Rfl:     Current Allergies     Allergies as of 02/06/2022 - Reviewed 02/06/2022   Allergen Reaction Noted    Latex  01/13/2020            The following portions of the patient's history were reviewed and updated as appropriate: allergies, current medications, past family history, past medical history, past social history, past surgical history and problem list      Past Medical History:   Diagnosis Date    Asthma     Fibromyalgia        Past Surgical History:   Procedure Laterality Date    ANKLE SURGERY Left     CARPAL TUNNEL RELEASE      CARPAL TUNNEL RELEASE      TUBAL LIGATION         History reviewed  No pertinent family history  Medications have been verified  Objective   Pulse 98   Temp 98 9 °F (37 2 °C) (Temporal)   Resp 18   Ht 5' 6 5" (1 689 m)   Wt 90 7 kg (200 lb)   SpO2 96%   BMI 31 80 kg/m²        Physical Exam     Physical Exam  Vitals reviewed  Constitutional:       General: She is not in acute distress  Appearance: She is well-developed  She is not ill-appearing, toxic-appearing or diaphoretic        Comments: 24-year-old white female sitting on the stretcher in no acute distress  HENT:      Head: Normocephalic and atraumatic  Ears:      Comments: Left ear is clear with a good cone of light    Right ear has some mild erythema with scar tissue     Mouth/Throat:      Pharynx: Oropharynx is clear  Eyes:      General: No scleral icterus  Extraocular Movements: Extraocular movements intact  Pupils: Pupils are equal, round, and reactive to light  Cardiovascular:      Rate and Rhythm: Normal rate and regular rhythm  Heart sounds: Normal heart sounds  Pulmonary:      Effort: Pulmonary effort is normal  No respiratory distress  Breath sounds: Normal breath sounds  No stridor  No wheezing, rhonchi or rales  Comments: Positive dry cough  Chest:      Chest wall: No tenderness  Abdominal:      General: Abdomen is flat  Bowel sounds are normal  There is no distension  Palpations: Abdomen is soft  There is no shifting dullness, hepatomegaly, splenomegaly or mass  Tenderness: There is no abdominal tenderness  There is no right CVA tenderness, left CVA tenderness or guarding  Negative signs include Hopper's sign and McBurney's sign  Hernia: No hernia is present  Skin:     General: Skin is warm and dry  Coloration: Skin is not cyanotic, jaundiced, mottled or pale  Findings: No erythema  Neurological:      General: No focal deficit present  Mental Status: She is alert and oriented to person, place, and time  Psychiatric:         Mood and Affect: Mood normal        Patient requested that her work note states that she can return to work with no restrictions and also that she was treated for an ear infection

## 2022-02-06 NOTE — LETTER
February 6, 2022     Patient: Bev Rodriguez   YOB: 1965   Date of Visit: 2/6/2022       To Whom It May Concern: It is my medical opinion that Shaka Stewart may return to work on 02/06/2022 without restrictions  She was out for an ear infection last week       If you have any questions or concerns, please don't hesitate to call           Sincerely,        Ju Alatorre DO    CC: No Recipients

## 2022-03-29 ENCOUNTER — HOSPITAL ENCOUNTER (EMERGENCY)
Facility: HOSPITAL | Age: 57
Discharge: HOME | End: 2022-03-29
Attending: INTERNAL MEDICINE
Payer: COMMERCIAL

## 2022-03-29 ENCOUNTER — APPOINTMENT (EMERGENCY)
Dept: RADIOLOGY | Facility: HOSPITAL | Age: 57
End: 2022-03-29
Payer: COMMERCIAL

## 2022-03-29 VITALS
HEART RATE: 96 BPM | DIASTOLIC BLOOD PRESSURE: 80 MMHG | SYSTOLIC BLOOD PRESSURE: 125 MMHG | RESPIRATION RATE: 16 BRPM | OXYGEN SATURATION: 97 % | TEMPERATURE: 98.1 F

## 2022-03-29 DIAGNOSIS — R07.89 ATYPICAL CHEST PAIN: Primary | ICD-10-CM

## 2022-03-29 LAB
2HR DELTA HS TROPONIN: >0 NG/L
ALBUMIN SERPL BCP-MCNC: 4.6 G/DL (ref 3.5–5)
ALP SERPL-CCNC: 59 U/L (ref 34–104)
ALT SERPL W P-5'-P-CCNC: 31 U/L (ref 7–52)
ANION GAP SERPL CALCULATED.3IONS-SCNC: 9 MMOL/L (ref 4–13)
AST SERPL W P-5'-P-CCNC: 19 U/L (ref 13–39)
ATRIAL RATE: 97 BPM
BASOPHILS # BLD AUTO: 0.02 THOUSANDS/ΜL (ref 0–0.1)
BASOPHILS NFR BLD AUTO: 0 % (ref 0–1)
BILIRUB SERPL-MCNC: 0.7 MG/DL (ref 0.2–1)
BUN SERPL-MCNC: 18 MG/DL (ref 5–25)
CALCIUM SERPL-MCNC: 10 MG/DL (ref 8.4–10.2)
CARDIAC TROPONIN I PNL SERPL HS: 2 NG/L
CARDIAC TROPONIN I PNL SERPL HS: <2 NG/L
CHLORIDE SERPL-SCNC: 100 MMOL/L (ref 96–108)
CO2 SERPL-SCNC: 28 MMOL/L (ref 21–32)
CREAT SERPL-MCNC: 0.74 MG/DL (ref 0.6–1.3)
EOSINOPHIL # BLD AUTO: 0.11 THOUSAND/ΜL (ref 0–0.61)
EOSINOPHIL NFR BLD AUTO: 2 % (ref 0–6)
ERYTHROCYTE [DISTWIDTH] IN BLOOD BY AUTOMATED COUNT: 13.3 % (ref 11.6–15.1)
GFR SERPL CREATININE-BSD FRML MDRD: 91 ML/MIN/1.73SQ M
GLUCOSE SERPL-MCNC: 101 MG/DL (ref 65–140)
HCT VFR BLD AUTO: 40.7 % (ref 34.8–46.1)
HGB BLD-MCNC: 13.7 G/DL (ref 11.5–15.4)
IMM GRANULOCYTES # BLD AUTO: 0.03 THOUSAND/UL (ref 0–0.2)
IMM GRANULOCYTES NFR BLD AUTO: 1 % (ref 0–2)
LYMPHOCYTES # BLD AUTO: 1.22 THOUSANDS/ΜL (ref 0.6–4.47)
LYMPHOCYTES NFR BLD AUTO: 27 % (ref 14–44)
MCH RBC QN AUTO: 26.2 PG (ref 26.8–34.3)
MCHC RBC AUTO-ENTMCNC: 33.7 G/DL (ref 31.4–37.4)
MCV RBC AUTO: 78 FL (ref 82–98)
MONOCYTES # BLD AUTO: 0.53 THOUSAND/ΜL (ref 0.17–1.22)
MONOCYTES NFR BLD AUTO: 12 % (ref 4–12)
NEUTROPHILS # BLD AUTO: 2.67 THOUSANDS/ΜL (ref 1.85–7.62)
NEUTS SEG NFR BLD AUTO: 58 % (ref 43–75)
NRBC BLD AUTO-RTO: 0 /100 WBCS
P AXIS: 53 DEGREES
PLATELET # BLD AUTO: 192 THOUSANDS/UL (ref 149–390)
PMV BLD AUTO: 9.8 FL (ref 8.9–12.7)
POTASSIUM SERPL-SCNC: 3.8 MMOL/L (ref 3.5–5.3)
PR INTERVAL: 153 MS
PROT SERPL-MCNC: 7.2 G/DL (ref 6.4–8.4)
QRS AXIS: 3 DEGREES
QRSD INTERVAL: 98 MS
QT INTERVAL: 366 MS
QTC INTERVAL: 465 MS
RBC # BLD AUTO: 5.22 MILLION/UL (ref 3.81–5.12)
SODIUM SERPL-SCNC: 137 MMOL/L (ref 135–147)
T WAVE AXIS: 38 DEGREES
VENTRICULAR RATE: 97 BPM
WBC # BLD AUTO: 4.58 THOUSAND/UL (ref 4.31–10.16)

## 2022-03-29 PROCEDURE — 80053 COMPREHEN METABOLIC PANEL: CPT | Performed by: INTERNAL MEDICINE

## 2022-03-29 PROCEDURE — 36415 COLL VENOUS BLD VENIPUNCTURE: CPT | Performed by: INTERNAL MEDICINE

## 2022-03-29 PROCEDURE — 99285 EMERGENCY DEPT VISIT HI MDM: CPT

## 2022-03-29 PROCEDURE — 93010 ELECTROCARDIOGRAM REPORT: CPT | Performed by: INTERNAL MEDICINE

## 2022-03-29 PROCEDURE — 85025 COMPLETE CBC W/AUTO DIFF WBC: CPT | Performed by: INTERNAL MEDICINE

## 2022-03-29 PROCEDURE — 99285 EMERGENCY DEPT VISIT HI MDM: CPT | Performed by: INTERNAL MEDICINE

## 2022-03-29 PROCEDURE — 71045 X-RAY EXAM CHEST 1 VIEW: CPT

## 2022-03-29 PROCEDURE — 93005 ELECTROCARDIOGRAM TRACING: CPT

## 2022-03-29 PROCEDURE — 84484 ASSAY OF TROPONIN QUANT: CPT | Performed by: INTERNAL MEDICINE

## 2022-03-29 NOTE — ED PROVIDER NOTES
History  Chief Complaint   Patient presents with    Chest Pain     Pt reports chest pressure into left shoulder x few weeks, also c/o seeing "floaties "     A this is a 56years old came for having chest pain for few weeks  Patient stated that the pain going to the left shoulder on and off  Patient denies any nausea vomiting, diaphoresis  Patient has no shortness of breath  Patient has pulse ox 95% on room air  Patient she received COVID vaccines  Patient has long history of fibromyalgia, hypertension, diabetes  Patient stated that her muscles are sensitive when she press on the chest it hurts her  Patient has history of multiple allergies  Patient is treated for the fibromyalgia with gabapentin  Patient  never have a stress test   Patient work at [employer] [address] and she lift heavy boxes  Patient also stated that sometime she see floaters and this is going on and off for few months  Patient denies any blurred vision or visual loss  Patient sitting comfortable at the ER  Prior to Admission Medications   Prescriptions Last Dose Informant Patient Reported? Taking?    Empagliflozin-metFORMIN HCl (SYNJARDY PO)   Yes Yes   Sig: Take by mouth   TiZANidine (ZANAFLEX) 2 MG capsule 3/29/2022 at Unknown time  Yes Yes   Sig: Take 2 mg by mouth daily as needed for muscle spasms     amitriptyline (ELAVIL) 25 mg tablet 3/28/2022 at Unknown time  Yes Yes   Sig: Take 25 mg by mouth   atorvastatin (LIPITOR) 20 mg tablet 3/28/2022 at Unknown time  Yes Yes   Sig: Take 10 mg by mouth daily   benzonatate (TESSALON PERLES) 100 mg capsule   No Yes   Sig: Take 1 capsule (100 mg total) by mouth every 8 (eight) hours   fluticasone (FLONASE) 50 mcg/act nasal spray 3/29/2022 at Unknown time  No Yes   Si spray into each nostril daily   gabapentin (NEURONTIN) 600 MG tablet 3/29/2022 at Unknown time  Yes Yes   Sig: Take 600 mg by mouth 3 (three) times a day   levocetirizine (XYZAL) 5 MG tablet 3/29/2022 at Unknown time  Yes Yes Sig: Take 5 mg by mouth   lisinopril (ZESTRIL) 2 5 mg tablet   Yes No   Sig: Take 2 5 mg by mouth daily     meclizine (ANTIVERT) 25 mg tablet Unknown at Unknown time  No No   Sig: Take 1 tablet (25 mg total) by mouth 3 (three) times a day as needed for dizziness   montelukast (SINGULAIR) 10 mg tablet 3/29/2022 at Unknown time  Yes Yes   Sig: Take 10 mg by mouth daily at bedtime     traZODone (DESYREL) 100 mg tablet 3/28/2022 at Unknown time  Yes Yes   Sig: TAKE 1 TABLET NIGHTLY      Facility-Administered Medications: None       Past Medical History:   Diagnosis Date    Asthma     Fibromyalgia        Past Surgical History:   Procedure Laterality Date    ANKLE SURGERY Left     CARPAL TUNNEL RELEASE      CARPAL TUNNEL RELEASE      TUBAL LIGATION         History reviewed  No pertinent family history  I have reviewed and agree with the history as documented  E-Cigarette/Vaping    E-Cigarette Use Never User      E-Cigarette/Vaping Substances    Nicotine No     THC No     CBD No     Flavoring No     Other No     Unknown No      Social History     Tobacco Use    Smoking status: Former Smoker    Smokeless tobacco: Former User   Vaping Use    Vaping Use: Never used   Substance Use Topics    Alcohol use: Yes     Comment: occ    Drug use: Never       Review of Systems   Constitutional: Negative for fatigue and fever  HENT: Negative for congestion, ear discharge, ear pain, nosebleeds, postnasal drip, sinus pressure, sneezing, sore throat and tinnitus  Eyes: Negative for photophobia, pain, discharge, redness, itching and visual disturbance  Respiratory: Negative for cough and shortness of breath  Cardiovascular: Positive for chest pain  Negative for palpitations  Gastrointestinal: Negative for abdominal pain, diarrhea, nausea and vomiting  Endocrine: Negative for polydipsia, polyphagia and polyuria  Genitourinary: Negative for difficulty urinating, dysuria, flank pain and frequency  Musculoskeletal: Negative for back pain, myalgias, neck pain and neck stiffness  Skin: Negative for color change, pallor and rash  Neurological: Negative for dizziness, light-headedness and headaches  Psychiatric/Behavioral: Negative for agitation and behavioral problems  Physical Exam  Physical Exam  Vitals and nursing note reviewed  Constitutional:       General: She is not in acute distress  Appearance: She is well-developed  She is not ill-appearing, toxic-appearing or diaphoretic  HENT:      Head: Normocephalic and atraumatic  Mouth/Throat:      Pharynx: No oropharyngeal exudate  Eyes:      Extraocular Movements: Extraocular movements intact  Pupils: Pupils are equal, round, and reactive to light  Neck:      Thyroid: No thyromegaly  Vascular: No hepatojugular reflux or JVD  Trachea: No tracheal deviation  Cardiovascular:      Rate and Rhythm: Normal rate and regular rhythm  Pulses:           Carotid pulses are 2+ on the right side and 2+ on the left side  Radial pulses are 2+ on the right side and 2+ on the left side  Dorsalis pedis pulses are 2+ on the right side and 2+ on the left side  Posterior tibial pulses are 2+ on the right side and 2+ on the left side  Heart sounds: Normal heart sounds  Heart sounds not distant  No murmur heard  No systolic murmur is present  No diastolic murmur is present  No friction rub  No gallop  No S3 or S4 sounds  Pulmonary:      Effort: Pulmonary effort is normal  No tachypnea, accessory muscle usage or respiratory distress  Breath sounds: Normal breath sounds  No stridor  No decreased breath sounds, wheezing, rhonchi or rales  Abdominal:      General: Bowel sounds are normal       Palpations: Abdomen is soft  Musculoskeletal:         General: No tenderness or deformity  Normal range of motion  Cervical back: Normal range of motion and neck supple        Right lower leg: No tenderness  No edema  Left lower leg: No tenderness  No edema  Lymphadenopathy:      Cervical: No cervical adenopathy  Skin:     General: Skin is warm and dry  Capillary Refill: Capillary refill takes less than 2 seconds  Coloration: Skin is not cyanotic or pale  Findings: No ecchymosis, erythema or rash  Nails: There is no clubbing  Neurological:      General: No focal deficit present  Mental Status: She is alert and oriented to person, place, and time     Psychiatric:         Mood and Affect: Mood normal          Behavior: Behavior normal          Vital Signs  ED Triage Vitals   Temperature Pulse Respirations Blood Pressure SpO2   03/29/22 0934 03/29/22 0934 03/29/22 0934 03/29/22 0934 03/29/22 0934   98 1 °F (36 7 °C) 100 18 146/81 95 %      Temp Source Heart Rate Source Patient Position - Orthostatic VS BP Location FiO2 (%)   03/29/22 0934 03/29/22 0934 03/29/22 0934 03/29/22 0934 --   Oral Monitor Sitting Left arm       Pain Score       03/29/22 1147       6           Vitals:    03/29/22 0934 03/29/22 1147   BP: 146/81 125/80   Pulse: 100 96   Patient Position - Orthostatic VS: Sitting          Visual Acuity      ED Medications  Medications - No data to display    Diagnostic Studies  Results Reviewed     Procedure Component Value Units Date/Time    HS Troponin I 2hr [[id number]]  (Normal) Collected: 03/29/22 1151    Lab Status: Final result Specimen: Blood from Arm, Left Updated: 03/29/22 1220     hs TnI 2hr 2 ng/L      Delta 2hr hsTnI >0 ng/L     HS Troponin 0hr (reflex protocol) [[id number]]  (Normal) Collected: 03/29/22 0955    Lab Status: Final result Specimen: Blood from Arm, Left Updated: 03/29/22 1024     hs TnI 0hr <2 ng/L     Comprehensive metabolic panel [[id number]] Collected: 03/29/22 0955    Lab Status: Final result Specimen: Blood from Arm, Left Updated: 03/29/22 1018     Sodium 137 mmol/L      Potassium 3 8 mmol/L      Chloride 100 mmol/L      CO2 28 mmol/L ANION GAP 9 mmol/L      BUN 18 mg/dL      Creatinine 0 74 mg/dL      Glucose 101 mg/dL      Calcium 10 0 mg/dL      AST 19 U/L      ALT 31 U/L      Alkaline Phosphatase 59 U/L      Total Protein 7 2 g/dL      Albumin 4 6 g/dL      Total Bilirubin 0 70 mg/dL      eGFR 91 ml/min/1 73sq m     Narrative:      [employer] guidelines for Chronic Kidney Disease (CKD):     Stage 1 with normal or high GFR (GFR > 90 mL/min/1 73 square meters)    Stage 2 Mild CKD (GFR = 60-89 mL/min/1 73 square meters)    Stage 3A Moderate CKD (GFR = 45-59 mL/min/1 73 square meters)    Stage 3B Moderate CKD (GFR = 30-44 mL/min/1 73 square meters)    Stage 4 Severe CKD (GFR = 15-29 mL/min/1 73 square meters)    Stage 5 End Stage CKD (GFR <15 mL/min/1 73 square meters)  Note: GFR calculation is accurate only with a steady state creatinine    CBC and differential [[id number]]  (Abnormal) Collected: 03/29/22 0955    Lab Status: Final result Specimen: Blood from Arm, Left Updated: 03/29/22 1000     WBC 4 58 Thousand/uL      RBC 5 22 Million/uL      Hemoglobin 13 7 g/dL      Hematocrit 40 7 %      MCV 78 fL      MCH 26 2 pg      MCHC 33 7 g/dL      RDW 13 3 %      MPV 9 8 fL      Platelets 137 Thousands/uL      nRBC 0 /100 WBCs      Neutrophils Relative 58 %      Immat GRANS % 1 %      Lymphocytes Relative 27 %      Monocytes Relative 12 %      Eosinophils Relative 2 %      Basophils Relative 0 %      Neutrophils Absolute 2 67 Thousands/µL      Immature Grans Absolute 0 03 Thousand/uL      Lymphocytes Absolute 1 22 Thousands/µL      Monocytes Absolute 0 53 Thousand/µL      Eosinophils Absolute 0 11 Thousand/µL      Basophils Absolute 0 02 Thousands/µL                  X-ray chest 1 view portable   Final Result by Serena Dobson MD (03/29 1101)      No acute cardiopulmonary disease                    Workstation performed: [id number]                    Procedures  ECG 12 Lead Documentation Only    Date/Time: 3/29/2022 10:14 AM  Performed by: Elise Gavin MD  Authorized by: Elise Gavin MD     Indications / Diagnosis:  Chest pain  ECG reviewed by me, the ED Provider: yes    Patient location:  ED and bedside  Previous ECG:     Previous ECG:  Unavailable    Comparison to cardiac monitor: Yes    Interpretation:     Interpretation: normal    Rate:     ECG rate:  97    ECG rate assessment: normal    Rhythm:     Rhythm: sinus rhythm    Ectopy:     Ectopy: none    QRS:     QRS axis:  Normal  Conduction:     Conduction: normal    ST segments:     ST segments:  Normal  T waves:     T waves: normal    Comments:      Low voltage             ED Course             HEART Risk Score      Most Recent Value   Heart Score Risk Calculator    History 0 Filed at: 03/29/2022 1126   ECG 0 Filed at: 03/29/2022 1126   Age 1 Filed at: 03/29/2022 1126   Risk Factors 2 Filed at: 03/29/2022 1126   Troponin 0 Filed at: 03/29/2022 1126   HEART Score 3 Filed at: 03/29/2022 1126                                      MDM  Number of Diagnoses or Management Options  Diagnosis management comments: A this is a 56years old came for having chest pain for few weeks  Patient has no SOB, nausea, vomiting, diaphoresis  Patient is diabetic hypertensive  Patient sitting comfortable at the ER patient stated that her muscles are sensitive when she press on it she has the pain  Patient EKG shows no ischemic changes  Troponin is<2  At this time are going to discharge patient home my and to follow-up with her PMD explain to the patient this is very unlikely that this could be cardiac issues because of the  She has the chest pain and troponin is negative and EKG is negative  All question concerns of the patient have been addressed         Amount and/or Complexity of Data Reviewed  Clinical lab tests: ordered and reviewed  Tests in the radiology section of CPT®: ordered and reviewed    Risk of Complications, Morbidity, and/or Mortality  Presenting problems: moderate  Diagnostic procedures: moderate  Management options: low        Disposition  Final diagnoses:   Atypical chest pain     Time reflects when diagnosis was documented in both MDM as applicable and the Disposition within this note     Time User Action Codes Description Comment    3/29/2022 12:44 PM Giana Irwin Add [R07 89] Atypical chest pain       ED Disposition     ED Disposition Condition Date/Time Comment    Discharge Stable Tue Mar 29, 2022 12:44 PM Robin Dave discharge to home/self care              Follow-up Information     Follow up With Specialties Details Why Contact Info      In 3 days  follow up with your PMD          Discharge Medication List as of 3/29/2022 12:45 PM      CONTINUE these medications which have NOT CHANGED    Details   amitriptyline (ELAVIL) 25 mg tablet Take 25 mg by mouth, Starting Wed 1/6/2021, Historical Med      atorvastatin (LIPITOR) 20 mg tablet Take 10 mg by mouth daily, Historical Med      benzonatate (TESSALON PERLES) 100 mg capsule Take 1 capsule (100 mg total) by mouth every 8 (eight) hours, Starting Sun 3/31/2019, Print      Empagliflozin-metFORMIN HCl (SYNJARDY PO) Take by mouth, Historical Med      fluticasone (FLONASE) 50 mcg/act nasal spray 1 spray into each nostril daily, Starting Wed 7/7/2021, Normal      gabapentin (NEURONTIN) 600 MG tablet Take 600 mg by mouth 3 (three) times a day, Historical Med      levocetirizine (XYZAL) 5 MG tablet Take 5 mg by mouth, Starting Thu 10/14/2021, Until Fri 10/14/2022 at 2359, Historical Med      montelukast (SINGULAIR) 10 mg tablet Take 10 mg by mouth daily at bedtime  , Historical Med      TiZANidine (ZANAFLEX) 2 MG capsule Take 2 mg by mouth daily as needed for muscle spasms  , Historical Med      traZODone (DESYREL) 100 mg tablet TAKE 1 TABLET NIGHTLY, Historical Med      lisinopril (ZESTRIL) 2 5 mg tablet Take 2 5 mg by mouth daily  , Starting Tue 12/28/2021, Historical Med      meclizine (ANTIVERT) 25 mg tablet Take 1 tablet (25 mg total) by mouth 3 (three) times a day as needed for dizziness, Starting Tue 5/4/2021, Normal             No discharge procedures on file      PDMP Review     None          ED Provider  Electronically Signed by           Donald Chandra MD  03/30/22 4640

## 2022-03-29 NOTE — DISCHARGE INSTRUCTIONS
FOLLOW up with your PMD  TAKE medications as prescribed by your PMD  Labs Reviewed   CBC AND DIFFERENTIAL - Abnormal       Result Value Ref Range Status    WBC 4 58  4 31 - 10 16 Thousand/uL Final    RBC 5 22 (*) 3 81 - 5 12 Million/uL Final    Hemoglobin 13 7  11 5 - 15 4 g/dL Final    Hematocrit 40 7  34 8 - 46 1 % Final    MCV 78 (*) 82 - 98 fL Final    MCH 26 2 (*) 26 8 - 34 3 pg Final    MCHC 33 7  31 4 - 37 4 g/dL Final    RDW 13 3  11 6 - 15 1 % Final    MPV 9 8  8 9 - 12 7 fL Final    Platelets 359  790 - 390 Thousands/uL Final    nRBC 0  /100 WBCs Final    Neutrophils Relative 58  43 - 75 % Final    Immat GRANS % 1  0 - 2 % Final    Lymphocytes Relative 27  14 - 44 % Final    Monocytes Relative 12  4 - 12 % Final    Eosinophils Relative 2  0 - 6 % Final    Basophils Relative 0  0 - 1 % Final    Neutrophils Absolute 2 67  1 85 - 7 62 Thousands/µL Final    Immature Grans Absolute 0 03  0 00 - 0 20 Thousand/uL Final    Lymphocytes Absolute 1 22  0 60 - 4 47 Thousands/µL Final    Monocytes Absolute 0 53  0 17 - 1 22 Thousand/µL Final    Eosinophils Absolute 0 11  0 00 - 0 61 Thousand/µL Final    Basophils Absolute 0 02  0 00 - 0 10 Thousands/µL Final   HS TROPONIN I 0HR - Normal    hs TnI 0hr <2  "Refer to ACS Flowchart"- see link ng/L Final    Comment:                                              Initial (time 0) result  If >=50 ng/L, Myocardial injury suggested ;  Type of myocardial injury and treatment strategy  to be determined  If 5-49 ng/L, a delta result at 2 hours and or 4 hours will be needed to further evaluate  If <4 ng/L, and chest pain has been >3 hours since onset, patient may qualify for discharge based on the HEART score in the ED  If <5 ng/L and <3hours since onset of chest pain, a delta result at 2 hours will be needed to further evaluate  HS Troponin 99th Percentile URL of a Health Population=12 ng/L with a 95% Confidence Interval of 8-18 ng/L      Second Troponin (time 2 hours)  If calculated delta >= 20 ng/L,  Myocardial injury suggested ; Type of myocardial injury and treatment strategy to be determined  If 5-49 ng/L and the calculated delta is 5-19 ng/L, consult medical service for evaluation  Continue evaluation for ischemia on ecg and other possible etiology and repeat hs troponin at 4 hours  If delta is <5 ng/L at 2 hours, consider discharge based on risk stratification via the HEART score (if in ED), or ALEXX risk score in IP/Observation  HS Troponin 99th Percentile URL of a Health Population=12 ng/L with a 95% Confidence Interval of 8-18 ng/L    HS TROPONIN I 2HR - Normal    hs TnI 2hr 2  "Refer to ACS Flowchart"- see link ng/L Final    Comment:                                              Initial (time 0) result  If >=50 ng/L, Myocardial injury suggested ;  Type of myocardial injury and treatment strategy  to be determined  If 5-49 ng/L, a delta result at 2 hours and or 4 hours will be needed to further evaluate  If <4 ng/L, and chest pain has been >3 hours since onset, patient may qualify for discharge based on the HEART score in the ED  If <5 ng/L and <3hours since onset of chest pain, a delta result at 2 hours will be needed to further evaluate  HS Troponin 99th Percentile URL of a Health Population=12 ng/L with a 95% Confidence Interval of 8-18 ng/L  Second Troponin (time 2 hours)  If calculated delta >= 20 ng/L,  Myocardial injury suggested ; Type of myocardial injury and treatment strategy to be determined  If 5-49 ng/L and the calculated delta is 5-19 ng/L, consult medical service for evaluation  Continue evaluation for ischemia on ecg and other possible etiology and repeat hs troponin at 4 hours  If delta is <5 ng/L at 2 hours, consider discharge based on risk stratification via the HEART score (if in ED), or ALEXX risk score in IP/Observation  HS Troponin 99th Percentile URL of a Health Population=12 ng/L with a 95% Confidence Interval of 8-18 ng/L  Delta 2hr hsTnI >0  <20 ng/L Final   COMPREHENSIVE METABOLIC PANEL    Sodium 734  135 - 147 mmol/L Final    Potassium 3 8  3 5 - 5 3 mmol/L Final    Chloride 100  96 - 108 mmol/L Final    CO2 28  21 - 32 mmol/L Final    ANION GAP 9  4 - 13 mmol/L Final    BUN 18  5 - 25 mg/dL Final    Creatinine 0 74  0 60 - 1 30 mg/dL Final    Comment: Standardized to IDMS reference method    Glucose 101  65 - 140 mg/dL Final    Comment: If the patient is fasting, the ADA then defines impaired fasting glucose as > 100 mg/dL and diabetes as > or equal to 123 mg/dL  Specimen collection should occur prior to Sulfasalazine administration due to the potential for falsely depressed results  Specimen collection should occur prior to Sulfapyridine administration due to the potential for falsely elevated results  Calcium 10 0  8 4 - 10 2 mg/dL Final    AST 19  13 - 39 U/L Final    Comment: Specimen collection should occur prior to Sulfasalazine administration due to the potential for falsely depressed results  ALT 31  7 - 52 U/L Final    Comment: Specimen collection should occur prior to Sulfasalazine administration due to the potential for falsely depressed results       Alkaline Phosphatase 59  34 - 104 U/L Final    Total Protein 7 2  6 4 - 8 4 g/dL Final    Albumin 4 6  3 5 - 5 0 g/dL Final    Total Bilirubin 0 70  0 20 - 1 00 mg/dL Final    eGFR 91  ml/min/1 73sq m Final    Narrative:     [place] guidelines for Chronic Kidney Disease (CKD):     Stage 1 with normal or high GFR (GFR > 90 mL/min/1 73 square meters)    Stage 2 Mild CKD (GFR = 60-89 mL/min/1 73 square meters)    Stage 3A Moderate CKD (GFR = 45-59 mL/min/1 73 square meters)    Stage 3B Moderate CKD (GFR = 30-44 mL/min/1 73 square meters)    Stage 4 Severe CKD (GFR = 15-29 mL/min/1 73 square meters)    Stage 5 End Stage CKD (GFR <15 mL/min/1 73 square meters)  Note: GFR calculation is accurate only with a steady state creatinine   HS TROPONIN I 4HR     X-ray chest 1 view portable   Final Result      No acute cardiopulmonary disease                    Workstation performed: [id number]

## 2022-10-26 ENCOUNTER — OCCMED (OUTPATIENT)
Dept: URGENT CARE | Facility: CLINIC | Age: 57
End: 2022-10-26
Payer: OTHER MISCELLANEOUS

## 2022-10-26 DIAGNOSIS — S00.90XA SUPERFICIAL HEAD INJURY, INITIAL ENCOUNTER: ICD-10-CM

## 2022-10-26 DIAGNOSIS — G44.019 EPISODIC CLUSTER HEADACHE, NOT INTRACTABLE: Primary | ICD-10-CM

## 2022-10-26 PROCEDURE — G0382 LEV 3 HOSP TYPE B ED VISIT: HCPCS

## 2022-10-26 PROCEDURE — 99283 EMERGENCY DEPT VISIT LOW MDM: CPT

## 2022-11-02 ENCOUNTER — OCCMED (OUTPATIENT)
Dept: URGENT CARE | Facility: CLINIC | Age: 57
End: 2022-11-02

## 2022-11-02 DIAGNOSIS — S00.80XD: Primary | ICD-10-CM

## 2022-11-02 DIAGNOSIS — G44.019 EPISODIC CLUSTER HEADACHE, NOT INTRACTABLE: ICD-10-CM

## 2022-11-04 ENCOUNTER — TELEPHONE (OUTPATIENT)
Dept: NEUROLOGY | Facility: CLINIC | Age: 57
End: 2022-11-04

## 2022-11-04 NOTE — TELEPHONE ENCOUNTER
I sent the faxed/scanned referral via e-mail to Dr Florance Hodgkins  Her response:  Sounds like someone who should be scheduled in a concussion slot with Dr Tony Eagle or Myself  Thanks        Called patient and left a voicemail  Advised her to callback to schedule a new patient appointment for her concussion  Provided that our concussion specialist only see patient's in [address]  Advised her to callback [facility] to schedule

## 2022-11-08 NOTE — TELEPHONE ENCOUNTER
Patient called back to schedule a new patient appointment for her concussion  Patient preferred to go to the [facility] office  Scheduled 11/16/22 at 11am with Dr Kalani Garcia  Provided appointment details and address

## 2022-11-10 ENCOUNTER — TELEPHONE (OUTPATIENT)
Dept: NEUROLOGY | Facility: CLINIC | Age: 57
End: 2022-11-10

## 2022-11-11 ENCOUNTER — APPOINTMENT (OUTPATIENT)
Dept: URGENT CARE | Facility: CLINIC | Age: 57
End: 2022-11-11

## 2022-11-16 ENCOUNTER — OFFICE VISIT (OUTPATIENT)
Dept: NEUROLOGY | Facility: CLINIC | Age: 57
End: 2022-11-16

## 2022-11-16 VITALS
WEIGHT: 204.2 LBS | HEART RATE: 60 BPM | BODY MASS INDEX: 32.46 KG/M2 | DIASTOLIC BLOOD PRESSURE: 92 MMHG | SYSTOLIC BLOOD PRESSURE: 132 MMHG

## 2022-11-16 DIAGNOSIS — G47.33 OBSTRUCTIVE SLEEP APNEA (ADULT) (PEDIATRIC): ICD-10-CM

## 2022-11-16 DIAGNOSIS — H51.11 CONVERGENCE INSUFFICIENCY: ICD-10-CM

## 2022-11-16 DIAGNOSIS — G44.309 POST-TRAUMATIC HEADACHE: Primary | ICD-10-CM

## 2022-11-16 DIAGNOSIS — G43.109 MIGRAINE WITH AURA AND WITHOUT STATUS MIGRAINOSUS, NOT INTRACTABLE: ICD-10-CM

## 2022-11-16 DIAGNOSIS — Z87.820 HISTORY OF CONCUSSION: ICD-10-CM

## 2022-11-16 RX ORDER — DIPHENHYDRAMINE HCL 25 MG
25 TABLET ORAL EVERY 6 HOURS PRN
COMMUNITY

## 2022-11-16 RX ORDER — AMITRIPTYLINE HYDROCHLORIDE 25 MG/1
50 TABLET, FILM COATED ORAL
Qty: 60 TABLET | Refills: 3 | Status: SHIPPED | OUTPATIENT
Start: 2022-11-16

## 2022-11-16 RX ORDER — PAROXETINE 10 MG/1
10 TABLET, FILM COATED ORAL EVERY EVENING
COMMUNITY
Start: 2022-10-22

## 2022-11-16 RX ORDER — DEXAMETHASONE 4 MG/1
TABLET ORAL
COMMUNITY
Start: 2022-10-07

## 2022-11-16 RX ORDER — MAGNESIUM 30 MG
30 TABLET ORAL 2 TIMES DAILY
COMMUNITY

## 2022-11-16 RX ORDER — LANOLIN ALCOHOL/MO/W.PET/CERES
400 CREAM (GRAM) TOPICAL DAILY
COMMUNITY

## 2022-11-16 RX ORDER — RIZATRIPTAN BENZOATE 10 MG/1
10 TABLET, ORALLY DISINTEGRATING ORAL ONCE AS NEEDED
Qty: 10 TABLET | Refills: 3 | Status: SHIPPED | OUTPATIENT
Start: 2022-11-16

## 2022-11-16 RX ORDER — KETOROLAC TROMETHAMINE 30 MG/ML
60 INJECTION, SOLUTION INTRAMUSCULAR; INTRAVENOUS ONCE
Status: COMPLETED | OUTPATIENT
Start: 2022-11-16 | End: 2022-11-16

## 2022-11-16 RX ORDER — MULTIVITAMIN
1 CAPSULE ORAL DAILY
COMMUNITY

## 2022-11-16 RX ORDER — UBIDECARENONE 75 MG
CAPSULE ORAL DAILY
COMMUNITY

## 2022-11-16 RX ADMIN — KETOROLAC TROMETHAMINE 60 MG: 30 INJECTION, SOLUTION INTRAMUSCULAR; INTRAVENOUS at 11:58

## 2022-11-16 NOTE — PROGRESS NOTES
Administered toradol injection into pt left buttock   No erythema  No induration   Pt tolerated injection

## 2022-11-16 NOTE — PROGRESS NOTES
[facility] Consult   PATIENT:  Malu Reyes  MRN:  [id number]  :  1965  DATE OF SERVICE:  2022  REFERRED BY: Self, Referral  PMD: No primary care provider on file  Assessment:     Malu Reyes is a very pleasant 56 y o  female with a past medical history that includes chronic pain, asthma, fibromyalgia, hyperlipidemia referred here for evaluation of mild TBI/concussion  Ms Marifer Allen presents after an injury at work where she hit her head on a metal beam approximately 6 weeks ago  Since that time, she has been suffering from daily headaches that have a migrainous quality to them and also endorses difficulties with sleep, word-finding, and balance  I suspect the majority of her symptoms are related to her intractable headaches  She did report a prior history of infrequent migraines but states that it has been about 8 years since her last true migraine  We discussed a variety of treatment options but thought that adjusting her amitriptyline to nightly use instead of as needed and slightly increasing the dose to 50 mg by make a difference  We discussed the potential for serotonin syndrome as she also takes paroxetine and trazodone for sleep  The symptoms that come with this and the potential risks were explained to the patient and she was agreeable to trialing this dose rather than continuing with 25 mg at bedtime  I did explain that if she were to experience any of the symptoms that come with serotonin syndrome she should stop her amitriptyline and paroxetine and go to the ER immediately  Potential contributing factors to her headaches and persistent symptoms include sleep apnea and fibromyalgia  She was not interested in pursuing a new sleep study at this time but will keep it in mind for the future  I also offered an MRI, primarily for reassurance, but she wanted to hold off on that for the time being    I suspect that as we treat her headaches majority of her symptoms will continue to improve  She has been encouraged to keep me updated throughout the process and let me know if there are any issues or concerns  Workup:  - 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  -We have discussed concussions and the natural course of recovery  We have discussed that symptoms from a concussion typically take 2 weeks to resolve, and although sometimes it can feel like concussion symptoms linger on, at this point these symptoms would be related to contributing factors  We also discussed that the course may wax and wane  - Contributing factors may include:   Prolonged removal from normal routine,  posttraumatic headache,  comorbid injuries, preexisting chronic headaches or migraines, cervicogenic headache, medication overuse headache, preexisting learning disability, history of concussion with prolonged recovery, anxiety or depression, stress, deconditioning,  comorbid medical diagnoses, young age  - I have recommended gradual return of normal cognitive and physical activity with safety precautions  - We discussed that newer research regarding concussion shows that the sooner one returns gradually to their normal physical and cognitive routine, the sooner one tends to recover   Prolonged removal from normal routine and deconditioning have been shown to prolong symptoms and worsen depression    - We discussed that sometimes there is a constellation of symptoms that some refer to as "post concussion syndrome," but I prefer not to use this term since that can be misleading and make people think they are still brain injured or "concussed," when the most common and likely etiology this far out from the head trauma is either contributing factors or a form of functional neurologic disorder with mixed symptoms, especially after a thorough workup to rule out other etiologies since concussion would not be the direct cause at this point    - We discussed how cognitive issues can have multiple causes and often related to multifactorial etiologies including stress, anxiety,  mood, pain, hypervigilance  and sleep issues and provided reassurance that, it is not likely the cognitive dysfunction is related to concussion at this point    - Safe driving precautions, should not drive at all if feeling sleepy or cognitively not well  Headache Preventive:  - Discussed headache hygiene and lifestyle factors that may improve headaches  - Discussed some headache preventative supplements that could be considered as below  - amitriptyline 50 mg HS    Headache Abortive:  - Discussed not taking over-the-counter or prescription headache abortive more than 2-3 days per week to prevent medication overuse headache  - Maxalt 10 mg ODT  - Toradol injection given in office- 60 mg  Patient instructions:   Referral  - Occupational therapy    Activity Plan:  General counseling as discussed regarding appropriate level of tolerable physical activity  Gradual return to physical activity  It is ok to push through light symptoms, we just don't want to significantly exacerbate symptoms  Headache/migraine treatment:   Abortive medications (for immediate treatment of a headache): Ok to take ibuprofen or acetaminophen for headaches, but try to limit the amount and frequency that you are taking to avoid medication overuse/rebound headache  Ideally no more than 2-3 days per week  Rizatriptan (MAXALT) ODT 10 MG tablet; Take 1 tablet (10 mg total) by mouth once as needed for migraine May repeat in 2 hours if needed  Max 2/24 hours, 10/month  Discussed proper use, possible side effects and risks      Prescription preventive medications for headaches/migraines   (to take every day to help prevent headaches - not to take at the time of headache):    Amitriptyline - 50mg at bedtime    This may help with:  [x] Headache prevention   [x] Sleep   [x] Mood/Emotion [] Cognition/Brain fog    *Typically these types of medications take time untill you see the benefit, although some may see improvement in days, often it may take weeks, especially if the medication is being titrated up to a beneficial level  Please contact us if there are any concerns or questions regarding the medication  Sleep/headache prevention:  -  Melatonin - you may take 1-3 mg nightly for sleep  You should take this at sundown consistently every night for it to work  It works by gradually helping to adjust your sleep time over days to weeks, rather than immediately making you feel sleepy  Lifestyle Recommendations:  - Maintain good sleep hygiene  Going to bed and waking up at consistent times, avoiding excessive daytime naps, avoiding caffeinated beverages in the evening, avoid excessive stimulation in the evening and generally using bed primarily for sleeping  One hour before bedtime would recommend turning lights down lower, decreasing your activity (may read quietly, listen to music at a low volume)  When you get into bed, should eliminate all technology (no texting, emailing, playing with your phone, iPad or tablet in bed)  - Maintain good hydration  Drink  2L of fluid a day (4 typical small water bottles)  - Maintain good nutrition  In particular don't skip meals and eat balanced meals regularly  Education and Follow-up  - Please contact us if any questions or concerns arise  Of course, try to protect yourself from head injuries, and if any new concerning symptoms or significant blow to the head or body go to the emergency department  - Follow up in 3 months  CC:   Jose Car is a  right handed female who presents for evaluation following a possible concussion  History obtained from patient as well as available medical record review  This is a Case that may be under litigation  We will not be writing any letters or working with  on their behalf   However, we will continue to do our best to provide the best medical care possible  History of Present Illness:   Current medical illnesses or past medical history include chronic pain, asthma, fibromyalgia, hyperlipidemia    Date/time of injury: 10/5/22  Definite reported mechanism of injury?   (discrete event with force to the head or rapid head movement without impact): Yes   Mechanism/Cause of injury: hit head on metal beam  Impact Location: Frontal   Intracranial injury or skull fx?: No  Loss of Conciousness?  did not occur   Seizure? No  Was there an onset of typical symptoms within 24-48 hours of the injury event? Yes   Has there been gradual recovery or stability of symptoms over the first week of the injury? [] Yes (There have been improving symptoms over the first week)  [x] Yes (There have been stable symptoms over the first week)  [] No (There have been worsening symptoms over the first week)    Specifics:   - Injury on 10/05/2022 at work  Turned around and hit her head on a metal beam   Reported a prior history of headaches but felt that these were different      Primary issues at this time:  [x] Headaches [] Oculomotor [] Vestibular [x] Cognitive [] Mood [] Sleep/Fatigue  Headaches  - Frontal; pressure  - Aura: "tongue tied" and vertigo  - At worst: 10/10, On Average: 7/10  - Frequency: 30/30; prior to injury, about 1/30  - Duration: If severe enough, can last about 4-6 hours  - Associated symptoms: light and sound sensitivity; nausea, but no vomiting; endorses word finding difficulties and trouble focusing  - Remitting factors: None  - Current medications: Advil/Tylenol - currently taking daily    Physical activity at baseline: daily - walks    Current level of physical activity: Light Aerobic - walking    Sleep: about 5 hours per night on average  Trouble falling asleep: [] Yes [x] No - takes Paxil, Trazodone and Amitriptyline PRN  Trouble staying asleep: [x] Yes [] No  History of sleep apnea: [x] Yes [] No - mild POLLO (last tested about 10 years ago)    Water: about 64oz  Diet: 3 meals per day    The following portions of the patient's history were reviewed in the system and updated as appropriate: allergies, current medications, past family history, past medical history, past social history, past surgical history and problem list     Pertinent family history:  [x] Migraines (sister)  [] Learning disability (ADHD, dyslexia)   [] Psych disorder (depression, anxiety)  [] none of the above    Pertinent social history:  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 No  Any other TBI's aside from Concussion? no     2  Preexisting Headache history? positive; infrequent  Prior Headache medication treatment? no  Headache Type:  [x] Migraine     3  Preexisting Psych history? positive      [x] Anxiety  [] Depression  []   Prior Psych treatment? Yes - managed by PCP    4  Preexisting Learning disability?   no    5  Preexisting Sleep problems? yes    6   History of seizures/epilepsy (non febrile) no    Past Medical History:   Diagnosis Date   • Asthma    • Fibromyalgia    • Head injury 10/05/2022     Patient Active Problem List   Diagnosis   • Lumbar spondylosis   • Chronic pain syndrome   • Myofascial pain syndrome       Medications:      Current Outpatient Medications   Medication Sig Dispense Refill   • amitriptyline (ELAVIL) 25 mg tablet Take 25 mg by mouth daily at bedtime as needed     • atorvastatin (LIPITOR) 20 mg tablet Take 10 mg by mouth daily     • benzonatate (TESSALON PERLES) 100 mg capsule Take 1 capsule (100 mg total) by mouth every 8 (eight) hours (Patient taking differently: Take 100 mg by mouth 3 (three) times a day as needed) 21 capsule 0   • cyanocobalamin (VITAMIN B-12) 100 mcg tablet Take by mouth daily     • diphenhydrAMINE (BENADRYL) 25 mg tablet Take 25 mg by mouth every 6 (six) hours as needed for itching     • Empagliflozin-metFORMIN HCl (SYNJARDY PO) Take by mouth     • Flovent  MCG/ACT inhaler      • fluticasone (FLONASE) 50 mcg/act nasal spray 1 spray into each nostril daily 16 g 0   • folic acid (FOLVITE) 352 mcg tablet Take 400 mcg by mouth daily     • gabapentin (NEURONTIN) 600 MG tablet Take 600 mg by mouth 2 (two) times a day     • levocetirizine (XYZAL) 5 MG tablet Take 5 mg by mouth     • lisinopril (ZESTRIL) 2 5 mg tablet Take 2 5 mg by mouth daily       • magnesium 30 MG tablet Take 30 mg by mouth 2 (two) times a day     • meclizine (ANTIVERT) 25 mg tablet Take 1 tablet (25 mg total) by mouth 3 (three) times a day as needed for dizziness 30 tablet 0   • montelukast (SINGULAIR) 10 mg tablet Take 10 mg by mouth daily at bedtime       • Multiple Vitamin (multivitamin) capsule Take 1 capsule by mouth daily     • PARoxetine (PAXIL) 10 mg tablet Take 10 mg by mouth every evening     • TiZANidine (ZANAFLEX) 2 MG capsule Take 2 mg by mouth daily as needed for muscle spasms       • traZODone (DESYREL) 100 mg tablet TAKE 1 TABLET NIGHTLY       No current facility-administered medications for this visit  Allergies: Allergies   Allergen Reactions   • Latex      Sensitivity         Family History:        History reviewed  No pertinent family history        Social History:       Social History     Socioeconomic History   • Marital status: /Civil Union     Spouse name: Not on file   • Number of children: Not on file   • Years of education: Not on file   • Highest education level: Not on file   Occupational History   • Not on file   Tobacco Use   • Smoking status: Former   • Smokeless tobacco: Former   Vaping Use   • Vaping Use: Never used   Substance and Sexual Activity   • Alcohol use: Yes     Comment: occ   • Drug use: Never   • Sexual activity: Not on file   Other Topics Concern   • Not on file   Social History Narrative   • Not on file Social Determinants of Health     Financial Resource Strain: Not on file   Food Insecurity: Not on file   Transportation Needs: Not on file   Physical Activity: Not on file   Stress: Not on file   Social Connections: Not on file   Intimate Partner Violence: Not on file   Housing Stability: Not on file       Objective:   Physical Exam:                                                                 Vitals:            Constitutional:    /92 (BP Location: Left arm, Patient Position: Sitting, Cuff Size: Adult)   Pulse 60   Wt 92 6 kg (204 lb 3 2 oz)   BMI 32 46 kg/m²   BP Readings from Last 3 Encounters:   11/16/22 132/92   03/29/22 125/80   11/01/21 130/76     Pulse Readings from Last 3 Encounters:   11/16/22 60   03/29/22 96   02/06/22 98         Well developed, well nourished, well groomed  No dysmorphic features  HEENT:  Normocephalic atraumatic  See neuro exam   Chest:  Respirations appear regular and unlabored  Cardiovascular:  no observed significant swelling  Musculoskeletal:  (see below under neurologic exam for evaluation of motor function and gait)   Skin:  warm and dry, not diaphoretic  Psychiatric:  Normal behavior and appropriate affect, but tearful at times       Neurological Examination:     Mental status/cognitive function:   Recent and remote memory intact  Attention span and concentration as well as fund of knowledge are appropriate for age  Normal language and spontaneous speech  Cranial Nerves:  III, IV, VI-Pupils were equal, round  Extraocular movements were full and conjugate   VII-facial expression symmetric  VIII-hearing grossly intact bilaterally   Motor Exam: symmetric bulk throughout  no atrophy, fasciculations or abnormal movements noted  4/5 with left hip flexion due to pain  Coordination:  no apparent dysmetria, ataxia or tremor noted  Gait: steady casual gait      Smooth Pursuits - provocative for symptoms?  Yes, headache  Saccades Horizontal - provocative for symptoms? Yes, headache   Saccades Vertical - provocative for symptoms? Yes, headache  VOR Horizontal - provocative for symptoms? Yes, headache  VOR Vertical - provocative for symptoms? Yes, headache  NPC: >5cm    Pertinent lab results: None     Pertinent Imaging:   CT head without contrast 07/07/2021: No acute intracranial findings  Mild ethmoid sinus mucosal thickening  I have personally reviewed imaging and radiology read  Review of Systems:   Constitutional: Positive for fatigue  Negative for appetite change and fever  HENT: Negative  Negative for hearing loss, tinnitus, trouble swallowing and voice change  Eyes: Positive for photophobia and visual disturbance (blurry vision in both eyes, more in left eye)  Negative for pain  Respiratory: Negative  Negative for shortness of breath  Cardiovascular: Negative  Negative for palpitations  Gastrointestinal: Positive for nausea  Negative for vomiting  Endocrine: Negative  Negative for cold intolerance  Genitourinary: Negative  Negative for dysuria, frequency and urgency  Musculoskeletal: Positive for gait problem (balance), neck pain (left side) and neck stiffness (left side)  Negative for myalgias  Skin: Negative  Negative for rash  Allergic/Immunologic: Negative  Neurological: Positive for dizziness, speech difficulty (word finding difficulty ) and headaches  Negative for tremors, seizures, syncope, facial asymmetry, weakness, light-headedness and numbness  Hematological: Negative  Does not bruise/bleed easily  Psychiatric/Behavioral: Positive for confusion, decreased concentration and sleep disturbance  Negative for hallucinations  All other systems reviewed and are negative  I have spent 47 minutes with Patient  today in which greater than 50% of this time was spent in counseling/coordination of care regarding Prognosis, Risks and benefits of tx options, Patient and family education and Impressions   I also spent 15 minutes non face to face for this patient the same day       Activity Minutes   Precharting/reviewing 10   Patient care/counseling 47   Postcharting/care coordination 5       Author:  Yas Mejia DO   Fellowship trained Concussion Specialist

## 2022-11-16 NOTE — PATIENT INSTRUCTIONS
Referral  - Occupational therapy    Activity Plan:  General counseling as discussed regarding appropriate level of tolerable physical activity  Gradual return to physical activity  It is ok to push through light symptoms, we just don't want to significantly exacerbate symptoms  Headache/migraine treatment:   Abortive medications (for immediate treatment of a headache): Ok to take ibuprofen or acetaminophen for headaches, but try to limit the amount and frequency that you are taking to avoid medication overuse/rebound headache  Ideally no more than 2-3 days per week  Rizatriptan (MAXALT) ODT 10 MG tablet; Take 1 tablet (10 mg total) by mouth once as needed for migraine May repeat in 2 hours if needed  Max 2/24 hours, 10/month  Discussed proper use, possible side effects and risks  Prescription preventive medications for headaches/migraines   (to take every day to help prevent headaches - not to take at the time of headache):    Amitriptyline - 50mg at bedtime    This may help with:  [x] Headache prevention   [x] Sleep   [x] Mood/Emotion [] Cognition/Brain fog    *Typically these types of medications take time untill you see the benefit, although some may see improvement in days, often it may take weeks, especially if the medication is being titrated up to a beneficial level  Please contact us if there are any concerns or questions regarding the medication  Sleep/headache prevention:  -  Melatonin - you may take 1-3 mg nightly for sleep  You should take this at sundown consistently every night for it to work  It works by gradually helping to adjust your sleep time over days to weeks, rather than immediately making you feel sleepy  Lifestyle Recommendations:  - Maintain good sleep hygiene    Going to bed and waking up at consistent times, avoiding excessive daytime naps, avoiding caffeinated beverages in the evening, avoid excessive stimulation in the evening and generally using bed primarily for sleeping  One hour before bedtime would recommend turning lights down lower, decreasing your activity (may read quietly, listen to music at a low volume)  When you get into bed, should eliminate all technology (no texting, emailing, playing with your phone, iPad or tablet in bed)  - Maintain good hydration  Drink  2L of fluid a day (4 typical small water bottles)  - Maintain good nutrition  In particular don't skip meals and eat balanced meals regularly  Education and Follow-up  - Please contact us if any questions or concerns arise  Of course, try to protect yourself from head injuries, and if any new concerning symptoms or significant blow to the head or body go to the emergency department    - Follow up in 3 months

## 2022-11-16 NOTE — LETTER
November 16, 2022     Patient: Fei Dose  YOB: 1965  Date of Visit: 11/16/2022      To Whom it May Concern:    Tegan Cullen is under my professional care  Jayce Hair was seen in my office on 11/16/2022 from 10:45 am to 12:05 pm  Jayce Hair may return to work on 11/16/22  If you have any questions or concerns, please don't hesitate to call           Sincerely,          Crow Melara DO        CC: No Recipients

## 2022-11-16 NOTE — PROGRESS NOTES
Review of Systems   Constitutional: Positive for fatigue  Negative for appetite change and fever  HENT: Negative  Negative for hearing loss, tinnitus, trouble swallowing and voice change  Eyes: Positive for photophobia and visual disturbance (blurry vision in both eyes, more in left eye)  Negative for pain  Respiratory: Negative  Negative for shortness of breath  Cardiovascular: Negative  Negative for palpitations  Gastrointestinal: Positive for nausea  Negative for vomiting  Endocrine: Negative  Negative for cold intolerance  Genitourinary: Negative  Negative for dysuria, frequency and urgency  Musculoskeletal: Positive for gait problem (balance), neck pain (left side) and neck stiffness (left side)  Negative for myalgias  Skin: Negative  Negative for rash  Allergic/Immunologic: Negative  Neurological: Positive for dizziness, speech difficulty (word finding difficulty ) and headaches  Negative for tremors, seizures, syncope, facial asymmetry, weakness, light-headedness and numbness  Hematological: Negative  Does not bruise/bleed easily  Psychiatric/Behavioral: Positive for confusion, decreased concentration and sleep disturbance  Negative for hallucinations  All other systems reviewed and are negative

## 2022-12-02 ENCOUNTER — APPOINTMENT (OUTPATIENT)
Dept: URGENT CARE | Facility: CLINIC | Age: 57
End: 2022-12-02

## 2022-12-27 ENCOUNTER — APPOINTMENT (OUTPATIENT)
Dept: URGENT CARE | Facility: CLINIC | Age: 57
End: 2022-12-27

## 2023-01-20 ENCOUNTER — APPOINTMENT (OUTPATIENT)
Dept: URGENT CARE | Facility: CLINIC | Age: 58
End: 2023-01-20

## 2023-01-29 ENCOUNTER — OFFICE VISIT (OUTPATIENT)
Dept: URGENT CARE | Facility: CLINIC | Age: 58
End: 2023-01-29

## 2023-01-29 ENCOUNTER — APPOINTMENT (OUTPATIENT)
Dept: RADIOLOGY | Facility: CLINIC | Age: 58
End: 2023-01-29

## 2023-01-29 VITALS
HEART RATE: 106 BPM | OXYGEN SATURATION: 96 % | TEMPERATURE: 97.6 F | DIASTOLIC BLOOD PRESSURE: 74 MMHG | RESPIRATION RATE: 18 BRPM | SYSTOLIC BLOOD PRESSURE: 135 MMHG

## 2023-01-29 DIAGNOSIS — S93.401A SPRAIN OF RIGHT ANKLE, UNSPECIFIED LIGAMENT, INITIAL ENCOUNTER: Primary | ICD-10-CM

## 2023-01-29 DIAGNOSIS — S93.401A SPRAIN OF RIGHT ANKLE, UNSPECIFIED LIGAMENT, INITIAL ENCOUNTER: ICD-10-CM

## 2023-01-29 NOTE — PROGRESS NOTES
[address] [facility]        NAME: Breann Antonio is a 56 y o  female  : 1965    MRN: [id number]  DATE: 2023  TIME: 2:01 PM    Assessment and Plan   Sprain of right ankle, unspecified ligament, initial encounter [S93 401A]  1  Sprain of right ankle, unspecified ligament, initial encounter  XR ankle 3+ vw right        Xr of the ankle obtained: no acute intraosseous abnormality  Wrapped ankle in ACE wrap  Recommended RICE and ibuprofen for symptoms  Follow up with PCP regarding ankle pain  Patient Instructions     Continue to RICE: rest area, ice it, keep ACE wrap compression on, and elevate above the level of the heart  Take ibuprofen for pain and swelling control  Refrain from exercise/sports for at least one week  Continue to move the ankle to increase mobility  Follow up with PCP or orthopedics for continued ankle pain  Proceed to ER if symptoms worsen  Chief Complaint     Chief Complaint   Patient presents with   • Ankle Pain     Pt c/o right ankle pain since rolling it on 23         History of Present Illness       Presents with pain to the right ankle since rolling it , 9 days ago  Still has swelling, bruising, and tenderness to the ankle  She has been able to walk on it  Pain is worse after standing for shifts at work  Review of Systems   Review of Systems   Constitutional: Negative for fever  HENT: Negative for congestion  Respiratory: Negative for cough and shortness of breath  Cardiovascular: Negative for chest pain  Musculoskeletal: Positive for gait problem  Skin: Positive for color change (bruising)  Negative for wound  Psychiatric/Behavioral: Negative for confusion           Current Medications       Current Outpatient Medications:   •  amitriptyline (ELAVIL) 25 mg tablet, Take 2 tablets (50 mg total) by mouth daily at bedtime, Disp: 60 tablet, Rfl: 3  •  atorvastatin (LIPITOR) 20 mg tablet, Take 10 mg by mouth daily, Disp: , Rfl:   • cyanocobalamin (VITAMIN B-12) 100 mcg tablet, Take by mouth daily, Disp: , Rfl:   •  diphenhydrAMINE (BENADRYL) 25 mg tablet, Take 25 mg by mouth every 6 (six) hours as needed for itching, Disp: , Rfl:   •  Empagliflozin-metFORMIN HCl (SYNJARDY PO), Take by mouth, Disp: , Rfl:   •  Flovent  MCG/ACT inhaler, , Disp: , Rfl:   •  fluticasone (FLONASE) 50 mcg/act nasal spray, 1 spray into each nostril daily, Disp: 16 g, Rfl: 0  •  folic acid (FOLVITE) 235 mcg tablet, Take 400 mcg by mouth daily, Disp: , Rfl:   •  gabapentin (NEURONTIN) 600 MG tablet, Take 600 mg by mouth 2 (two) times a day, Disp: , Rfl:   •  lisinopril (ZESTRIL) 2 5 mg tablet, Take 2 5 mg by mouth daily  , Disp: , Rfl:   •  magnesium 30 MG tablet, Take 30 mg by mouth 2 (two) times a day, Disp: , Rfl:   •  meclizine (ANTIVERT) 25 mg tablet, Take 1 tablet (25 mg total) by mouth 3 (three) times a day as needed for dizziness, Disp: 30 tablet, Rfl: 0  •  montelukast (SINGULAIR) 10 mg tablet, Take 10 mg by mouth daily at bedtime  , Disp: , Rfl:   •  Multiple Vitamin (multivitamin) capsule, Take 1 capsule by mouth daily, Disp: , Rfl:   •  PARoxetine (PAXIL) 10 mg tablet, Take 10 mg by mouth every evening, Disp: , Rfl:   •  rizatriptan (MAXALT-MLT) 10 mg disintegrating tablet, Take 1 tablet (10 mg total) by mouth once as needed for migraine for up to 1 dose May repeat in 2 hours if needed, Disp: 10 tablet, Rfl: 3  •  TiZANidine (ZANAFLEX) 2 MG capsule, Take 2 mg by mouth daily as needed for muscle spasms  , Disp: , Rfl:   •  traZODone (DESYREL) 100 mg tablet, TAKE 1 TABLET NIGHTLY, Disp: , Rfl:   •  benzonatate (TESSALON PERLES) 100 mg capsule, Take 1 capsule (100 mg total) by mouth every 8 (eight) hours (Patient taking differently: Take 100 mg by mouth 3 (three) times a day as needed), Disp: 21 capsule, Rfl: 0  •  levocetirizine (XYZAL) 5 MG tablet, Take 5 mg by mouth, Disp: , Rfl:     Current Allergies     Allergies as of 01/29/2023 - Reviewed 01/29/2023   Allergen Reaction Noted   • Latex  01/13/2020            The following portions of the patient's history were reviewed and updated as appropriate: allergies, current medications, past family history, past medical history, past social history, past surgical history and problem list      Past Medical History:   Diagnosis Date   • Asthma    • Fibromyalgia    • Head injury 10/05/2022       Past Surgical History:   Procedure Laterality Date   • ANKLE SURGERY Left    • CARPAL TUNNEL RELEASE     • CARPAL TUNNEL RELEASE     • TUBAL LIGATION         History reviewed  No pertinent family history  Medications have been verified  Objective   /74   Pulse (!) 106   Temp 97 6 °F (36 4 °C) (Temporal)   Resp 18   SpO2 96%        Physical Exam     Physical Exam  Vitals reviewed  Constitutional:       Appearance: Normal appearance  Cardiovascular:      Rate and Rhythm: Normal rate and regular rhythm  Pulses: Normal pulses  Pulmonary:      Effort: Pulmonary effort is normal  No respiratory distress  Musculoskeletal:         General: Normal range of motion  Right lower leg: Swelling present  No deformity, lacerations, tenderness or bony tenderness  Left lower leg: No swelling  Right ankle: Swelling and ecchymosis present  No deformity or lacerations  No tenderness  Normal range of motion  Anterior drawer test negative  Normal pulse  Right Achilles Tendon: Normal       Left ankle: No swelling  Right foot: Normal  Normal range of motion and normal capillary refill  No deformity, bunion, Charcot foot, foot drop or bony tenderness  Normal pulse  Left foot: Normal    Skin:     General: Skin is warm and dry  Capillary Refill: Capillary refill takes less than 2 seconds  Neurological:      General: No focal deficit present  Mental Status: She is alert and oriented to person, place, and time     Psychiatric:         Mood and Affect: Mood normal  Behavior: Behavior normal

## 2023-01-29 NOTE — PATIENT INSTRUCTIONS
Continue to RICE: rest area, ice it, keep ACE wrap compression on, and elevate above the level of the heart  Take ibuprofen for pain and swelling control  Continue to move the ankle to increase mobility-  referral sent to PT  Follow up with orthopedics for continued ankle pain  Proceed to ER if symptoms worsen

## 2023-01-29 NOTE — LETTER
January 29, 2023     Patient: Dasha Watkins   YOB: 1965   Date of Visit: 1/29/2023       To Whom it May Concern:    Elida Carrion was seen in my clinic on 1/29/2023  She should be excused from 1/29/23 until 1/4/231  She can return 1/5/23  If you have any questions or concerns, please don't hesitate to call           Sincerely,          LEONOR Gonzáles        CC: No Recipients

## 2023-02-02 ENCOUNTER — TELEPHONE (OUTPATIENT)
Dept: NEUROLOGY | Facility: CLINIC | Age: 58
End: 2023-02-02

## 2023-02-02 NOTE — TELEPHONE ENCOUNTER
Called pt and moved appt on 3/16 with Dr Zakiya Hernandez to an earlier time, same day, as provider will be out of office in the afternoon   Pt accepted

## 2023-02-10 ENCOUNTER — APPOINTMENT (OUTPATIENT)
Dept: URGENT CARE | Facility: CLINIC | Age: 58
End: 2023-02-10

## 2023-03-08 ENCOUNTER — OFFICE VISIT (OUTPATIENT)
Dept: OBGYN CLINIC | Facility: CLINIC | Age: 58
End: 2023-03-08

## 2023-03-08 VITALS
DIASTOLIC BLOOD PRESSURE: 84 MMHG | HEIGHT: 67 IN | HEART RATE: 97 BPM | WEIGHT: 202 LBS | SYSTOLIC BLOOD PRESSURE: 123 MMHG | BODY MASS INDEX: 31.71 KG/M2

## 2023-03-08 DIAGNOSIS — S93.401A SPRAIN OF RIGHT ANKLE, UNSPECIFIED LIGAMENT, INITIAL ENCOUNTER: ICD-10-CM

## 2023-03-08 DIAGNOSIS — S99.911A RIGHT ANKLE INJURY, INITIAL ENCOUNTER: Primary | ICD-10-CM

## 2023-03-08 DIAGNOSIS — S86.301A PERONEAL TENDON INJURY, RIGHT, INITIAL ENCOUNTER: ICD-10-CM

## 2023-03-08 NOTE — PROGRESS NOTES
Assessment/Plan:  Assessment/Plan   Diagnoses and all orders for this visit:    Right ankle injury, initial encounter  -     MRI ankle/heel right  wo contrast; Future    Peroneal tendon injury, right, initial encounter  -     MRI ankle/heel right  wo contrast; Future      53-year-old active female with onset of right ankle pain and instability from twisting injury approximately 01/20/2023  Discussed with patient physical exam, imaging studies, impression, and plan  X-rays right ankle noted for cortical irregularity of the anterior tibial plafond appreciable only on lateral view  Physical exam right ankle noted for lateral soft tissue swelling  She has tenderness at the peroneal tendon, lateral malleolus, ATFL, distal tibia, and talar dome  She has intact range of motion of the ankle  She has weakness with eversion  Passive external rotation and syndesmosis squeeze are unremarkable  She is intact neurovascularly  Her mechanism of injury, subsequent symptoms, and clinical exam are concerning for tendon disruption  She is more than 6 weeks since injury and still having symptoms despite conservative management of rest, compression, icing, Tylenol  At this time I will refer for MRI of the right ankle to evaluate for peroneal tendon tear and osteochondral lesion as invasive management/surgical intervention may be warranted  She will follow-up after getting MRI done  In the interim she is to apply topical diclofenac gel 3 times a day and she is started taking turmeric at least 1000 mg daily and tart cherry at least 1000 mg daily  Subjective:   Patient ID: Bev Truong is a 66 y o  female  Chief Complaint   Patient presents with   • Right Ankle - Pain       53-year-old female presents evaluation of right ankle pain and swelling following inversion injury 01/20/2023  She stepped on an object causing her ankle to twist and eversion  She felt and heard a pop and fell to the ground in pain    She had pain described as sudden in onset, localized to the lateral aspect the ankle, rating proximally along the lateral aspect lower leg, sharp and burning, associated with swelling, worse with moving the ankle and bearing weight, and improved with resting  She presented to urgent care and x-ray evaluation was unremarkable for acute osseous abnormality  She started treating with elevation, icing, taking Tylenol, and compression wrap  She reports that pain and swelling still persist and swelling worsens with prolonged activity  Ankle Pain  This is a new problem  The current episode started more than 1 month ago  The problem occurs daily  The problem has been unchanged  Associated symptoms include arthralgias and joint swelling  Pertinent negatives include no abdominal pain, chest pain, chills, fever, numbness, rash, sore throat or weakness  The symptoms are aggravated by standing, walking and twisting  She has tried rest, position changes, ice and acetaminophen (Compression) for the symptoms  The following portions of the patient's history were reviewed and updated as appropriate: She  has a past medical history of Asthma, Fibromyalgia, and Head injury (10/05/2022)  She  has a past surgical history that includes Ankle surgery (Left); Tubal ligation; Carpal tunnel release; and Carpal tunnel release  Her family history is not on file  She  reports that she has quit smoking  She has quit using smokeless tobacco  She reports current alcohol use  She reports that she does not use drugs  She is allergic to latex       Review of Systems   Constitutional: Negative for chills and fever  HENT: Negative for sore throat  Eyes: Negative for visual disturbance  Respiratory: Negative for shortness of breath  Cardiovascular: Negative for chest pain  Gastrointestinal: Negative for abdominal pain  Genitourinary: Negative for flank pain  Musculoskeletal: Positive for arthralgias and joint swelling     Skin: Negative for rash and wound  Neurological: Negative for weakness and numbness  Hematological: Does not bruise/bleed easily  Psychiatric/Behavioral: Negative for self-injury  Objective:  Vitals:    03/08/23 0813   BP: 123/84   Pulse: 97   Weight: 91 6 kg (202 lb)   Height: 5' 6 5" (1 689 m)     Right Ankle Exam     Muscle Strength   Dorsiflexion:  5/5  Plantar flexion:  5/5    Other   Sensation: normal  Pulse: present           Observations     Right Ankle/Foot   Negative for deformity  Tenderness     Right Ankle/Foot   Tenderness in the anterior ankle, anterior talofibular ligament, lateral malleolus, peroneal tendon and talar dome  No tenderness in the Achilles insertion, fifth metatarsal base, calcaneofibular ligament, deltoid ligament, medial malleolus, posterior tibial tendon, posterior talofibular ligament and proximal Achilles  Additional Tenderness Details  Right  - Distal tibia    Active Range of Motion     Right Ankle/Foot   Dorsiflexion (kf): WFL  Plantar flexion: WFL  Inversion: WFL  Eversion: WFL    Strength/Myotome Testing     Right Ankle/Foot   Dorsiflexion: 5  Plantar flexion: 5  Inversion: 5  Eversion: 4+    Tests     Right Ankle/Foot   Negative for anterior drawer, calcaneal squeeze, posterior drawer, syndesmosis squeeze and syndesmosis external rotation  Physical Exam  Vitals and nursing note reviewed  Constitutional:       General: She is not in acute distress  Appearance: She is well-developed  She is not ill-appearing or diaphoretic  HENT:      Head: Normocephalic  Right Ear: External ear normal       Left Ear: External ear normal    Eyes:      Conjunctiva/sclera: Conjunctivae normal    Neck:      Trachea: No tracheal deviation  Cardiovascular:      Rate and Rhythm: Normal rate  Pulmonary:      Effort: Pulmonary effort is normal  No respiratory distress  Abdominal:      General: There is no distension     Musculoskeletal:      Right ankle: Tenderness present over the lateral malleolus and ATF ligament  No medial malleolus, CF ligament, posterior TF ligament or base of 5th metatarsal tenderness  Anterior drawer test negative  Right foot: No deformity  Skin:     General: Skin is warm and dry  Neurological:      Mental Status: She is alert and oriented to person, place, and time  Psychiatric:         Behavior: Behavior normal          I have personally reviewed pertinent films in PACS and my interpretation is X-rays right ankle noted for cortical irregularity of the anterior tibial plafond appreciable only on lateral view

## 2023-03-16 DIAGNOSIS — G43.109 MIGRAINE WITH AURA AND WITHOUT STATUS MIGRAINOSUS, NOT INTRACTABLE: ICD-10-CM

## 2023-03-16 DIAGNOSIS — G44.309 POST-TRAUMATIC HEADACHE: ICD-10-CM

## 2023-03-16 DIAGNOSIS — Z87.820 HISTORY OF CONCUSSION: ICD-10-CM

## 2023-03-16 RX ORDER — AMITRIPTYLINE HYDROCHLORIDE 25 MG/1
TABLET, FILM COATED ORAL
Qty: 60 TABLET | Refills: 3 | Status: SHIPPED | OUTPATIENT
Start: 2023-03-16 | End: 2023-03-22 | Stop reason: SDUPTHER

## 2023-03-22 ENCOUNTER — OFFICE VISIT (OUTPATIENT)
Dept: NEUROLOGY | Facility: CLINIC | Age: 58
End: 2023-03-22

## 2023-03-22 VITALS
SYSTOLIC BLOOD PRESSURE: 128 MMHG | BODY MASS INDEX: 32.9 KG/M2 | OXYGEN SATURATION: 95 % | DIASTOLIC BLOOD PRESSURE: 80 MMHG | HEART RATE: 98 BPM | HEIGHT: 67 IN | TEMPERATURE: 97.6 F | WEIGHT: 209.6 LBS

## 2023-03-22 DIAGNOSIS — G47.33 OBSTRUCTIVE SLEEP APNEA (ADULT) (PEDIATRIC): ICD-10-CM

## 2023-03-22 DIAGNOSIS — G43.109 MIGRAINE WITH AURA AND WITHOUT STATUS MIGRAINOSUS, NOT INTRACTABLE: ICD-10-CM

## 2023-03-22 DIAGNOSIS — G47.9 SLEEP DIFFICULTIES: ICD-10-CM

## 2023-03-22 DIAGNOSIS — G44.309 POST-TRAUMATIC HEADACHE: Primary | ICD-10-CM

## 2023-03-22 DIAGNOSIS — Z87.820 HISTORY OF CONCUSSION: ICD-10-CM

## 2023-03-22 RX ORDER — AMITRIPTYLINE HYDROCHLORIDE 25 MG/1
50 TABLET, FILM COATED ORAL
Qty: 60 TABLET | Refills: 6 | Status: SHIPPED | OUTPATIENT
Start: 2023-03-22

## 2023-03-22 RX ORDER — RIZATRIPTAN BENZOATE 10 MG/1
10 TABLET, ORALLY DISINTEGRATING ORAL ONCE AS NEEDED
Qty: 10 TABLET | Refills: 6 | Status: SHIPPED | OUTPATIENT
Start: 2023-03-22

## 2023-03-22 NOTE — PROGRESS NOTES
[facility] Consult - Follow up   PATIENT:  Marylen Estelle  MRN:  [id number]  :  1965  DATE OF SERVICE:  3/22/2023  REFERRED BY: No ref  provider found  PMD: No primary care provider on file  Assessment/Plan:   Marylen Estelle is a very pleasant 56 y o  female with a past medical history that includes chronic pain, asthma, fibromyalgia, hyperlipidemia here for f/u evaluation of mild TBI/concussion and headaches  Since her last visit, she has had a significant improvement in terms of her headaches  Currently experiencing about 12 headache days per month  She takes amitriptyline 50 mg at bedtime without any issues  Rizatriptan works well for abortive management  I believe she is still suffering from headaches due to difficulties with sleep  She has a longstanding history of problems with sleep and has a history of obstructive sleep apnea in the past as well  I would like her to undergo evaluation with a diagnostic sleep study  She would prefer not to have a home study as she has pets at home that are very disruptive throughout the night  She is going to touch base with her primary care doctor about stopping trazodone since she does not get much benefit with it and if that is the case, I will increase her amitriptyline to 75 mg at bedtime  Workup:  - CT head without contrast 2021: No acute intracranial findings    Mild ethmoid sinus mucosal thickening   - Sleep study     Headache Preventive:  - Discussed headache hygiene and lifestyle factors that may improve headaches  - Discussed some headache preventative supplements that could be considered as below  - amitriptyline 50 mg HS     Headache Abortive:  - Discussed not taking over-the-counter or prescription headache abortive more than 2-3 days per week to prevent medication overuse headache  - Maxalt 10 mg ODT  Patient instructions   Additional Testing  Sleep study    Headache/migraine treatment: Abortive medications (for immediate treatment of a headache): Ok to take ibuprofen or acetaminophen for headaches, but try to limit the amount and frequency that you are taking to avoid medication overuse/rebound headache  Ideally no more than 2-3 days per week      Rizatriptan (MAXALT) ODT 10 MG tablet; Take 1 tablet (10 mg total) by mouth once as needed for migraine May repeat in 2 hours if needed  Max 2/24 hours, 10/month  Discussed proper use, possible side effects and risks      Prescription preventive medications for headaches/migraines   (to take every day to help prevent headaches - not to take at the time of headache):     Amitriptyline - 50mg at bedtime  - Touch base with your primary care about the Trazodone  If you stop this, we can consider increasing the Amitriptyline to 75mg at bedtime     Lifestyle Recommendations:  - Maintain good sleep hygiene  Going to bed and waking up at consistent times, avoiding excessive daytime naps, avoiding caffeinated beverages in the evening, avoid excessive stimulation in the evening and generally using bed primarily for sleeping  One hour before bedtime would recommend turning lights down lower, decreasing your activity (may read quietly, listen to music at a low volume)  When you get into bed, should eliminate all technology (no texting, emailing, playing with your phone, iPad or tablet in bed)  - Maintain good hydration  Drink  2L of fluid a day (4 typical small water bottles)  - Maintain good nutrition  In particular don't skip meals and eat balanced meals regularly      Education and Follow-up  - Please contact us if any questions or concerns arise  Of course, try to protect yourself from head injuries, and if any new concerning symptoms or significant blow to the head or body go to the emergency department  - Follow up in 6 months  Subjective:   3/22/23: Since her last visit, she reports improvement in terms of her headaches    Currently experiencing about 3 headaches per week  Finds that they occur when she has to work  Previously was experiencing daily headaches  In terms of sleep, she reports about 6 hours per night  Wakes up at 2:30AM for work  Positive history of snoring  Last sleep study about 13 years ago - mild POLLO  Previous History:  11/16/22: Ms Roberta Lundberg presents after an injury at work where she hit her head on a metal beam approximately 6 weeks ago  Since that time, she has been suffering from daily headaches that have a migrainous quality to them and also endorses difficulties with sleep, word-finding, and balance  I suspect the majority of her symptoms are related to her intractable headaches  She did report a prior history of infrequent migraines but states that it has been about 8 years since her last true migraine  We discussed a variety of treatment options but thought that adjusting her amitriptyline to nightly use instead of as needed and slightly increasing the dose to 50 mg by make a difference  We discussed the potential for serotonin syndrome as she also takes paroxetine and trazodone for sleep  The symptoms that come with this and the potential risks were explained to the patient and she was agreeable to trialing this dose rather than continuing with 25 mg at bedtime  I did explain that if she were to experience any of the symptoms that come with serotonin syndrome she should stop her amitriptyline and paroxetine and go to the ER immediately  Potential contributing factors to her headaches and persistent symptoms include sleep apnea and fibromyalgia  She was not interested in pursuing a new sleep study at this time but will keep it in mind for the future  I also offered an MRI, primarily for reassurance, but she wanted to hold off on that for the time being  I suspect that as we treat her headaches majority of her symptoms will continue to improve    She has been encouraged to keep me updated throughout the process and let me know if there are any issues or concerns      Past Medical History:     Past Medical History:   Diagnosis Date   • Asthma    • Fibromyalgia    • Head injury 10/05/2022       Patient Active Problem List   Diagnosis   • Lumbar spondylosis   • Chronic pain syndrome   • Myofascial pain syndrome       Medications:      Current Outpatient Medications   Medication Sig Dispense Refill   • amitriptyline (ELAVIL) 25 mg tablet take 2 tablets by mouth at bedtime 60 tablet 3   • atorvastatin (LIPITOR) 20 mg tablet Take 10 mg by mouth daily     • benzonatate (TESSALON PERLES) 100 mg capsule Take 1 capsule (100 mg total) by mouth every 8 (eight) hours (Patient taking differently: Take 100 mg by mouth 3 (three) times a day as needed) 21 capsule 0   • cyanocobalamin (VITAMIN B-12) 100 mcg tablet Take by mouth daily     • diphenhydrAMINE (BENADRYL) 25 mg tablet Take 25 mg by mouth every 6 (six) hours as needed for itching     • Empagliflozin-metFORMIN HCl (SYNJARDY PO) Take by mouth     • Flovent  MCG/ACT inhaler      • fluticasone (FLONASE) 50 mcg/act nasal spray 1 spray into each nostril daily 16 g 0   • folic acid (FOLVITE) 705 mcg tablet Take 400 mcg by mouth daily     • gabapentin (NEURONTIN) 600 MG tablet Take 600 mg by mouth 2 (two) times a day     • levocetirizine (XYZAL) 5 MG tablet Take 5 mg by mouth     • lisinopril (ZESTRIL) 2 5 mg tablet Take 2 5 mg by mouth daily       • magnesium 30 MG tablet Take 30 mg by mouth 2 (two) times a day     • meclizine (ANTIVERT) 25 mg tablet Take 1 tablet (25 mg total) by mouth 3 (three) times a day as needed for dizziness 30 tablet 0   • montelukast (SINGULAIR) 10 mg tablet Take 10 mg by mouth daily at bedtime       • Multiple Vitamin (multivitamin) capsule Take 1 capsule by mouth daily     • PARoxetine (PAXIL) 10 mg tablet Take 10 mg by mouth every evening     • rizatriptan (MAXALT-MLT) 10 mg disintegrating tablet Take 1 tablet (10 mg total) by mouth once as needed for migraine for up to 1 dose May repeat in 2 hours if needed 10 tablet 3   • TiZANidine (ZANAFLEX) 2 MG capsule Take 2 mg by mouth daily as needed for muscle spasms       • traZODone (DESYREL) 100 mg tablet TAKE 1 TABLET NIGHTLY       No current facility-administered medications for this visit  Allergies: Allergies   Allergen Reactions   • Latex      Sensitivity         Family History:     History reviewed  No pertinent family history  Social History:     Social History     Socioeconomic History   • Marital status: /Civil Union     Spouse name: Not on file   • Number of children: Not on file   • Years of education: Not on file   • Highest education level: Not on file   Occupational History   • Not on file   Tobacco Use   • Smoking status: Former   • Smokeless tobacco: Former   Vaping Use   • Vaping Use: Never used   Substance and Sexual Activity   • Alcohol use: Yes     Comment: occ   • Drug use: Never   • Sexual activity: Not on file   Other Topics Concern   • Not on file   Social History Narrative   • Not on file     Social Determinants of Health     Financial Resource Strain: Not on file   Food Insecurity: Not on file   Transportation Needs: Not on file   Physical Activity: Not on file   Stress: Not on file   Social Connections: Not on file   Intimate Partner Violence: Not on file   Housing Stability: Not on file         Objective:   Physical Exam:                                                               Vitals:            Constitutional:    /80 (BP Location: Left arm, Patient Position: Sitting, Cuff Size: Large)   Pulse 98   Temp 97 6 °F (36 4 °C) (Temporal)   Ht 5' 6 5" (1 689 m)   Wt 95 1 kg (209 lb 9 6 oz)   SpO2 95%   BMI 33 32 kg/m²   BP Readings from Last 3 Encounters:   03/22/23 128/80   03/08/23 123/84   01/29/23 135/74     Pulse Readings from Last 3 Encounters:   03/22/23 98   03/08/23 97   01/29/23 (!) 106         Well developed, well nourished, well groomed  No dysmorphic features  HEENT:  Normocephalic atraumatic  See neuro exam   Chest:  Respirations appear regular and unlabored  Cardiovascular:  no observed significant swelling  Musculoskeletal:  (see below under neurologic exam for evaluation of motor function and gait)   Skin:  warm and dry, not diaphoretic  Psychiatric:  Normal behavior and appropriate affect       Neurological Examination:     Mental status/cognitive function:   Recent and remote memory intact  Attention span and concentration as well as fund of knowledge are appropriate for age  Normal language and spontaneous speech  Cranial Nerves:  III, IV, VI-Pupils were equal, round  Extraocular movements were full and conjugate   VII-facial expression symmetric  VIII-hearing grossly intact bilaterally   Motor Exam: symmetric bulk throughout  no atrophy, fasciculations or abnormal movements noted  Coordination:  no apparent dysmetria, ataxia or tremor noted  Gait: steady casual gait  Review of Systems:   Constitutional: Negative  Negative for appetite change and fever  HENT: Negative  Negative for hearing loss, tinnitus, trouble swallowing and voice change  Eyes: Negative  Negative for photophobia, pain and visual disturbance  Respiratory: Negative  Negative for shortness of breath  Cardiovascular: Negative  Negative for palpitations  Gastrointestinal: Negative  Negative for nausea and vomiting  Endocrine: Negative  Negative for cold intolerance  Genitourinary: Negative  Negative for dysuria, frequency and urgency  Musculoskeletal: Negative  Negative for gait problem, myalgias and neck pain  Skin: Negative  Negative for rash  Allergic/Immunologic: Negative  Neurological: Positive for headaches  Negative for dizziness, tremors, seizures, syncope, facial asymmetry, speech difficulty, weakness, light-headedness and numbness  Hematological: Negative  Does not bruise/bleed easily  Psychiatric/Behavioral: Negative    Negative for confusion, hallucinations and sleep disturbance  All other systems reviewed and are negative  I have spent 25 minutes with Patient  today in which greater than 50% of this time was spent in counseling/coordination of care regarding Prognosis, Risks and benefits of tx options, Patient and family education, Impressions, Documenting in the medical record and Reviewing / ordering tests, medicine, procedures    I also spent 10 minutes non face to face for this patient the same day       Activity Minutes   Precharting/reviewing 5   Patient care/counseling 25   Postcharting/care coordination 5       Author:  Yas Mejia DO 3/22/2023 11:24 AM

## 2023-03-22 NOTE — PATIENT INSTRUCTIONS
Additional Testing  Sleep study    Headache/migraine treatment:   Abortive medications (for immediate treatment of a headache): Ok to take ibuprofen or acetaminophen for headaches, but try to limit the amount and frequency that you are taking to avoid medication overuse/rebound headache  Ideally no more than 2-3 days per week  Rizatriptan (MAXALT) ODT 10 MG tablet; Take 1 tablet (10 mg total) by mouth once as needed for migraine May repeat in 2 hours if needed  Max 2/24 hours, 10/month  Discussed proper use, possible side effects and risks  Prescription preventive medications for headaches/migraines   (to take every day to help prevent headaches - not to take at the time of headache):     Amitriptyline - 50mg at bedtime  - Touch base with your primary care about the Trazodone  If you stop this, we can consider increasing the Amitriptyline to 75mg at bedtime     Lifestyle Recommendations:  - Maintain good sleep hygiene  Going to bed and waking up at consistent times, avoiding excessive daytime naps, avoiding caffeinated beverages in the evening, avoid excessive stimulation in the evening and generally using bed primarily for sleeping  One hour before bedtime would recommend turning lights down lower, decreasing your activity (may read quietly, listen to music at a low volume)  When you get into bed, should eliminate all technology (no texting, emailing, playing with your phone, iPad or tablet in bed)  - Maintain good hydration  Drink  2L of fluid a day (4 typical small water bottles)  - Maintain good nutrition  In particular don't skip meals and eat balanced meals regularly  Education and Follow-up  - Please contact us if any questions or concerns arise  Of course, try to protect yourself from head injuries, and if any new concerning symptoms or significant blow to the head or body go to the emergency department    - Follow up in 6 months Vaccine status unknown No

## 2023-03-22 NOTE — PROGRESS NOTES
Review of Systems   Constitutional: Negative  Negative for appetite change and fever  HENT: Negative  Negative for hearing loss, tinnitus, trouble swallowing and voice change  Eyes: Negative  Negative for photophobia, pain and visual disturbance  Respiratory: Negative  Negative for shortness of breath  Cardiovascular: Negative  Negative for palpitations  Gastrointestinal: Negative  Negative for nausea and vomiting  Endocrine: Negative  Negative for cold intolerance  Genitourinary: Negative  Negative for dysuria, frequency and urgency  Musculoskeletal: Negative  Negative for gait problem, myalgias and neck pain  Skin: Negative  Negative for rash  Allergic/Immunologic: Negative  Neurological: Positive for headaches  Negative for dizziness, tremors, seizures, syncope, facial asymmetry, speech difficulty, weakness, light-headedness and numbness  Hematological: Negative  Does not bruise/bleed easily  Psychiatric/Behavioral: Negative  Negative for confusion, hallucinations and sleep disturbance  All other systems reviewed and are negative  Since your last visit are your headaches improved    Any change to the headache type? no    What is your current headache frequency: 3 per week    Are you taking your current medications as prescribed? yes    If no, why not? Do you have any side effects? no    How may days per week do you take an abortive medicine?  3

## 2023-03-24 ENCOUNTER — HOSPITAL ENCOUNTER (OUTPATIENT)
Dept: MRI IMAGING | Facility: HOSPITAL | Age: 58
End: 2023-03-24
Attending: FAMILY MEDICINE

## 2023-03-24 DIAGNOSIS — S99.911A RIGHT ANKLE INJURY, INITIAL ENCOUNTER: ICD-10-CM

## 2023-03-24 DIAGNOSIS — S86.301A PERONEAL TENDON INJURY, RIGHT, INITIAL ENCOUNTER: ICD-10-CM

## 2023-03-31 ENCOUNTER — TELEPHONE (OUTPATIENT)
Dept: SLEEP CENTER | Facility: CLINIC | Age: 58
End: 2023-03-31

## 2023-03-31 NOTE — TELEPHONE ENCOUNTER
----- Message from Enriqueta Anderson MD sent at 3/31/2023  8:10 AM EDT -----  Approved    ----- Message -----  From: Breonna Tejeda  Sent: 3/24/2023   8:26 AM EDT  To: Sleep Medicine [facility] Provider    This Diagnostic sleep study needs approval      If approved please sign and return to clerical pool  If denied please include reasons why  Also provide alternative testing if warranted  Please sign and return to clerical pool

## 2023-05-16 ENCOUNTER — TELEPHONE (OUTPATIENT)
Dept: NEUROLOGY | Facility: CLINIC | Age: 58
End: 2023-05-16

## 2023-05-16 NOTE — TELEPHONE ENCOUNTER
Patient called and needs to speak with the nurses in regards to an authorization      Patient can be reached at [phone]

## 2023-06-15 ENCOUNTER — HOSPITAL ENCOUNTER (OUTPATIENT)
Dept: SLEEP CENTER | Facility: CLINIC | Age: 58
Discharge: HOME | End: 2023-06-15
Payer: COMMERCIAL

## 2023-06-15 DIAGNOSIS — G47.33 OBSTRUCTIVE SLEEP APNEA (ADULT) (PEDIATRIC): ICD-10-CM

## 2023-06-15 PROCEDURE — 95810 POLYSOM 6/> YRS 4/> PARAM: CPT

## 2023-06-16 NOTE — PROGRESS NOTES
Sleep Study Documentation    Pre-Sleep Study       Sleep testing procedure explained to patient:YES    Patient napped prior to study:NO    Caffeine:Dayshift worker after 12PM   Caffeine use:YES- tea  6 ounces    Alcohol:Dayshift workers after 5PM: Alcohol use:NO    Typical day for patient:YES       Study Documentation    Sleep Study Indications: Chronic headache AM, POLLO    Sleep Study: Diagnostic   Snore: Moderate  Supplemental O2: no      Minimum SaO2 83 0%  Baseline SaO2 91 1%    EKG abnormalities: no     EEG abnormalities: no    Sleep Study Recorded < 2 hours: N/A    Sleep Study Recorded > 2 hours but incomplete study: N/A    Sleep Study Recorded 6 hours but no sleep obtained: NO    Patient classification: employed       Post-Sleep Study    Medication used at bedtime or during sleep study:YES prescription sleep aid    Patient reports time it took to fall asleep:30 to 60 minutes    Patient reports waking up during study:3 or more times  Patient reports returning to sleep in 10 to 30 minutes  Patient reports sleeping 4 to 6 hours without dreaming  Patient reports sleep during study:typical    Patient rated sleepiness: Very sleepy or tired    PAP treatment:no

## 2023-06-19 ENCOUNTER — OFFICE VISIT (OUTPATIENT)
Dept: URGENT CARE | Facility: CLINIC | Age: 58
End: 2023-06-19
Payer: COMMERCIAL

## 2023-06-19 VITALS
TEMPERATURE: 97.9 F | BODY MASS INDEX: 33.39 KG/M2 | HEART RATE: 96 BPM | OXYGEN SATURATION: 98 % | DIASTOLIC BLOOD PRESSURE: 82 MMHG | SYSTOLIC BLOOD PRESSURE: 140 MMHG | WEIGHT: 210 LBS | RESPIRATION RATE: 16 BRPM

## 2023-06-19 DIAGNOSIS — S93.401A SPRAIN OF RIGHT ANKLE, UNSPECIFIED LIGAMENT, INITIAL ENCOUNTER: Primary | ICD-10-CM

## 2023-06-19 PROCEDURE — 99213 OFFICE O/P EST LOW 20 MIN: CPT | Performed by: PHYSICIAN ASSISTANT

## 2023-06-19 NOTE — PATIENT INSTRUCTIONS
Right ankle sprain  Rest, ice, elevate  Follow up with PCP in 3-5 days    Proceed to  ER if symptoms worsen

## 2023-06-19 NOTE — PROGRESS NOTES
[facility] [facility]        NAME: Sina Cabral is a 66 y o  female  : 1965    MRN: [id number]  DATE: 2023  TIME: 12:23 PM    Assessment and Plan   Sprain of right ankle, unspecified ligament, initial encounter [S93 401A]  1  Sprain of right ankle, unspecified ligament, initial encounter              Patient Instructions     Right ankle sprain  Rest, ice, elevate  Follow up with PCP in 3-5 days  Proceed to  ER if symptoms worsen  Chief Complaint     Chief Complaint   Patient presents with   • Ankle Pain     Right ankle, pt rolled ankle 3 months ago  Sharp pain daily for 1 week back of foot, causing entire foot to be painful  More painful when standing and walking  Taking advil and tylenol as needed  History of Present Illness       53-year-old female who presents complaining of right ankle pain  Patient states that she sprained her ankle a few weeks ago at which time she had x-rays which were negative and an MRI to follow which was also negative  Patient states that over the last couple of weeks she has developed increased pain and believes it is due to working 10 hours on her feet  Patient states that she thinks she just needs rest and requests an excuse note  Denies recent trauma and declined xrays at this time  Ankle Pain         Review of Systems   Review of Systems   Constitutional: Negative  HENT: Negative  Eyes: Negative  Respiratory: Negative  Negative for cough, chest tightness, shortness of breath, wheezing and stridor  Cardiovascular: Negative  Negative for chest pain, palpitations and leg swelling  Musculoskeletal: Positive for arthralgias           Current Medications       Current Outpatient Medications:   •  amitriptyline (ELAVIL) 25 mg tablet, Take 2 tablets (50 mg total) by mouth daily at bedtime, Disp: 60 tablet, Rfl: 6  •  atorvastatin (LIPITOR) 20 mg tablet, Take 10 mg by mouth daily, Disp: , Rfl:   •  benzonatate (TESSALON PERLES) 100 mg capsule, Take 1 capsule (100 mg total) by mouth every 8 (eight) hours (Patient taking differently: Take 100 mg by mouth 3 (three) times a day as needed), Disp: 21 capsule, Rfl: 0  •  cyanocobalamin (VITAMIN B-12) 100 mcg tablet, Take by mouth daily, Disp: , Rfl:   •  diphenhydrAMINE (BENADRYL) 25 mg tablet, Take 25 mg by mouth every 6 (six) hours as needed for itching, Disp: , Rfl:   •  Empagliflozin-metFORMIN HCl (SYNJARDY PO), Take by mouth, Disp: , Rfl:   •  Flovent  MCG/ACT inhaler, , Disp: , Rfl:   •  fluticasone (FLONASE) 50 mcg/act nasal spray, 1 spray into each nostril daily, Disp: 16 g, Rfl: 0  •  folic acid (FOLVITE) 933 mcg tablet, Take 400 mcg by mouth daily, Disp: , Rfl:   •  gabapentin (NEURONTIN) 600 MG tablet, Take 600 mg by mouth 2 (two) times a day, Disp: , Rfl:   •  levocetirizine (XYZAL) 5 MG tablet, Take 5 mg by mouth, Disp: , Rfl:   •  lisinopril (ZESTRIL) 2 5 mg tablet, Take 2 5 mg by mouth daily  , Disp: , Rfl:   •  magnesium 30 MG tablet, Take 30 mg by mouth 2 (two) times a day, Disp: , Rfl:   •  meclizine (ANTIVERT) 25 mg tablet, Take 1 tablet (25 mg total) by mouth 3 (three) times a day as needed for dizziness, Disp: 30 tablet, Rfl: 0  •  montelukast (SINGULAIR) 10 mg tablet, Take 10 mg by mouth daily at bedtime  , Disp: , Rfl:   •  Multiple Vitamin (multivitamin) capsule, Take 1 capsule by mouth daily, Disp: , Rfl:   •  PARoxetine (PAXIL) 10 mg tablet, Take 10 mg by mouth every evening, Disp: , Rfl:   •  rizatriptan (MAXALT-MLT) 10 mg disintegrating tablet, Take 1 tablet (10 mg total) by mouth once as needed for migraine for up to 1 dose May repeat in 2 hours if needed, Disp: 10 tablet, Rfl: 6  •  TiZANidine (ZANAFLEX) 2 MG capsule, Take 2 mg by mouth daily as needed for muscle spasms  , Disp: , Rfl:   •  traZODone (DESYREL) 100 mg tablet, TAKE 1 TABLET NIGHTLY, Disp: , Rfl:     Current Allergies     Allergies as of 06/19/2023 - Reviewed 03/22/2023   Allergen Reaction Noted   • Latex  01/13/2020            The following portions of the patient's history were reviewed and updated as appropriate: allergies, current medications, past family history, past medical history, past social history, past surgical history and problem list      Past Medical History:   Diagnosis Date   • Asthma    • Fibromyalgia    • Head injury 10/05/2022       Past Surgical History:   Procedure Laterality Date   • ANKLE SURGERY Left    • CARPAL TUNNEL RELEASE     • CARPAL TUNNEL RELEASE     • TUBAL LIGATION         No family history on file  Medications have been verified  Objective   /82   Pulse 96   Temp 97 9 °F (36 6 °C)   Resp 16   Wt 95 3 kg (210 lb)   SpO2 98%   BMI 33 39 kg/m²        Physical Exam     Physical Exam  Constitutional:       Appearance: Normal appearance  She is well-developed  HENT:      Head: Normocephalic and atraumatic  Right Ear: External ear normal       Left Ear: External ear normal       Nose: Nose normal       Mouth/Throat:      Pharynx: No oropharyngeal exudate  Cardiovascular:      Rate and Rhythm: Normal rate and regular rhythm  Heart sounds: Normal heart sounds  Pulmonary:      Effort: Pulmonary effort is normal  No respiratory distress  Breath sounds: Normal breath sounds  No wheezing or rales  Chest:      Chest wall: No tenderness  Musculoskeletal:      Cervical back: Normal range of motion and neck supple  Right ankle: No swelling, deformity, ecchymosis or lacerations  Tenderness present over the lateral malleolus  No medial malleolus, ATF ligament, AITF ligament, CF ligament, posterior TF ligament, base of 5th metatarsal or proximal fibula tenderness  Decreased range of motion  Anterior drawer test negative  Normal pulse  Right Achilles Tendon: Normal       Left ankle: Normal       Left Achilles Tendon: Normal    Lymphadenopathy:      Cervical: No cervical adenopathy  Neurological:      Mental Status: She is alert

## 2023-06-19 NOTE — LETTER
June 19, 2023     Patient: Claudia Jiménez   YOB: 1965   Date of Visit: 6/19/2023       To Whom it May Concern:    Brian Isabel was seen in my clinic on 6/19/2023  She may return to work on 6/20/2023  If you have any questions or concerns, please don't hesitate to call           Sincerely,          TRINITY Umaña        CC: No Recipients

## 2023-06-19 NOTE — LETTER
June 19, 2023     Patient: Seth Andrade   YOB: 1965   Date of Visit: 6/19/2023       To Whom it May Concern:    John Batista was seen in my clinic on 6/19/2023  She may return to work on 6/21/2023  If you have any questions or concerns, please don't hesitate to call           Sincerely,          TRINITY Umaña        CC: No Recipients

## 2023-06-19 NOTE — LETTER
June 19, 2023     Patient: Juan Manuel Cota   YOB: 1965   Date of Visit: 6/19/2023       To Whom it May Concern:    Baljits Jun was seen in my clinic on 6/19/2023  She may return to work on 6/22/2023  If you have any questions or concerns, please don't hesitate to call           Sincerely,          TRINITY Umaña        CC: No Recipients

## 2023-06-20 ENCOUNTER — OFFICE VISIT (OUTPATIENT)
Dept: OBGYN CLINIC | Facility: CLINIC | Age: 58
End: 2023-06-20
Payer: COMMERCIAL

## 2023-06-20 VITALS
BODY MASS INDEX: 32.96 KG/M2 | HEART RATE: 90 BPM | HEIGHT: 67 IN | SYSTOLIC BLOOD PRESSURE: 144 MMHG | DIASTOLIC BLOOD PRESSURE: 90 MMHG | WEIGHT: 210 LBS

## 2023-06-20 DIAGNOSIS — M25.871 IMPINGEMENT SYNDROME OF RIGHT ANKLE: Primary | ICD-10-CM

## 2023-06-20 PROCEDURE — 99213 OFFICE O/P EST LOW 20 MIN: CPT | Performed by: FAMILY MEDICINE

## 2023-06-20 NOTE — LETTER
June 20, 2023     Patient: Sina Cabral  YOB: 1965  Date of Visit: 6/20/2023      To Whom it May Concern:    Lavell Pinedo is under my professional care  Anselmo Cooper was seen in my office on 6/20/2023  Anselmo Cooper is unable to return to work at this time  She is referred to ankle specialist for further evaluation  If you have any questions or concerns, please don't hesitate to call           Sincerely,          Yovanny Glaser DO        CC: No Recipients

## 2023-06-20 NOTE — PROGRESS NOTES
Assessment/Plan:  Assessment/Plan   Diagnoses and all orders for this visit:    Impingement syndrome of right ankle  -     Ambulatory Referral to Orthopedic Surgery; Future        43-year-old active female with right ankle pain following injury 1/20/2023  Discussed with patient MRI results, impression, and plan  MRI right ankle noted for mild diffuse Achilles tendinosis, peroneus longus tendinosis without tear  Physical exam noted for anterior and lateral soft tissue swelling  She has tenderness at the anterior ankle and subfibular aspect  She has intact strength of the ankle however pain with dorsiflexion and eversion  Clinical impression is that she may have abnormal mechanics causing impingement in the ankle  Patient was given option to consider formal therapy versus evaluation by ankle specialist   She declines referral therapy as she states she has been on home exercise program   I will refer her to foot and ankle specialist for further evaluation and recommendation of treatment  She will follow-up with me needed  Subjective:   Patient ID: Nadja Santos is a 66 y o  female  Chief Complaint   Patient presents with   • Right Ankle - Follow-up, Pain       43-year-old active female following up for right ankle pain following injury 1/20/2023  She was last seen by me 3 5 months ago at which point she was referred for MRI of the right ankle  She reports worsening of symptoms since her last visit  She has pain described as generalized and ankle but worse to the anterior, lateral, and posterior aspect, radiating proximally to the lower leg, worse with bearing weight and ambulating, worse with twisting of the ankle, associated with swelling, associated with instability, and improved with resting and elevation  She states she has been doing exercises on regular basis which was given to her by physical therapy and she has tried wearing ankle brace however it caused aggravation of symptoms   Ankle has "felt more unstable and has been giving out on her  Ankle Pain  This is a new problem  The current episode started more than 1 month ago  The problem occurs daily  The problem has been gradually worsening  Associated symptoms include arthralgias and joint swelling  Pertinent negatives include no numbness or weakness  The symptoms are aggravated by standing, walking and twisting  She has tried rest, position changes, NSAIDs and ice (Compression, exercise therapy) for the symptoms  The treatment provided mild relief  Review of Systems   Musculoskeletal: Positive for arthralgias and joint swelling  Neurological: Negative for weakness and numbness  Objective:  Vitals:    06/20/23 1457   BP: 144/90   Pulse: 90   Weight: 95 3 kg (210 lb)   Height: 5' 6 5\" (1 689 m)     Right Ankle Exam     Muscle Strength   Dorsiflexion:  5/5  Plantar flexion:  5/5          Observations     Right Ankle/Foot   Negative for deformity  Tenderness     Right Ankle/Foot   Tenderness in the anterior ankle, fibula, lateral malleolus, peroneal tendon and talar dome  No tenderness in the dorsum foot  Additional Tenderness Details  Right  - Subfibular    Active Range of Motion     Right Ankle/Foot   Dorsiflexion (kf): 10 degrees with pain  Plantar flexion: WFL  Inversion: WFL  Eversion: WFL and with pain    Strength/Myotome Testing     Right Ankle/Foot   Dorsiflexion: 5  Plantar flexion: 5  Inversion: 5  Eversion: 5      Physical Exam  Vitals and nursing note reviewed  Constitutional:       General: She is not in acute distress  Appearance: She is well-developed  She is not ill-appearing or diaphoretic  HENT:      Head: Normocephalic and atraumatic  Right Ear: External ear normal       Left Ear: External ear normal    Eyes:      Conjunctiva/sclera: Conjunctivae normal    Neck:      Trachea: No tracheal deviation  Cardiovascular:      Rate and Rhythm: Normal rate     Pulmonary:      Effort: Pulmonary " effort is normal  No respiratory distress  Abdominal:      General: There is no distension  Musculoskeletal:         General: Swelling and tenderness present  No deformity or signs of injury  Right ankle: Tenderness present over the lateral malleolus  Right foot: No deformity  Skin:     General: Skin is warm and dry  Coloration: Skin is not jaundiced or pale  Neurological:      Mental Status: She is alert and oriented to person, place, and time  Psychiatric:         Mood and Affect: Mood normal          Behavior: Behavior normal          Thought Content: Thought content normal          Judgment: Judgment normal          I have personally reviewed pertinent films in PACS and my interpretation is No appreciable tendon or ligamentous disruption  No appreciable osteochondral lesion or stress fracture

## 2023-06-22 ENCOUNTER — TELEPHONE (OUTPATIENT)
Dept: OBGYN CLINIC | Facility: CLINIC | Age: 58
End: 2023-06-22

## 2023-06-22 NOTE — TELEPHONE ENCOUNTER
Caller: Patient    Doctor: Aurelia Vidal     Reason for call: Calling about short term dis paperwork       She will call back once she has it to either fax or upload via [employer]    Call back#: n/a

## 2023-07-11 ENCOUNTER — OFFICE VISIT (OUTPATIENT)
Dept: OBGYN CLINIC | Facility: CLINIC | Age: 58
End: 2023-07-11
Payer: COMMERCIAL

## 2023-07-11 VITALS
SYSTOLIC BLOOD PRESSURE: 140 MMHG | DIASTOLIC BLOOD PRESSURE: 93 MMHG | HEART RATE: 94 BPM | WEIGHT: 210 LBS | HEIGHT: 67 IN | BODY MASS INDEX: 32.96 KG/M2

## 2023-07-11 DIAGNOSIS — S93.401A SPRAIN OF UNSPECIFIED LIGAMENT OF RIGHT ANKLE, INITIAL ENCOUNTER: Primary | ICD-10-CM

## 2023-07-11 DIAGNOSIS — M25.871 IMPINGEMENT SYNDROME OF RIGHT ANKLE: ICD-10-CM

## 2023-07-11 PROCEDURE — 99213 OFFICE O/P EST LOW 20 MIN: CPT | Performed by: ORTHOPAEDIC SURGERY

## 2023-07-11 RX ORDER — CELECOXIB 200 MG/1
CAPSULE ORAL
COMMUNITY
Start: 2023-07-02

## 2023-07-11 RX ORDER — PANTOPRAZOLE SODIUM 40 MG/1
TABLET, DELAYED RELEASE ORAL
COMMUNITY
Start: 2023-04-22

## 2023-07-11 RX ORDER — AMITRIPTYLINE HYDROCHLORIDE 50 MG/1
TABLET, FILM COATED ORAL
COMMUNITY
Start: 2023-04-22

## 2023-07-11 RX ORDER — ATORVASTATIN CALCIUM 40 MG/1
TABLET, FILM COATED ORAL
COMMUNITY
Start: 2023-04-22

## 2023-07-11 NOTE — PROGRESS NOTES
Carmen Barnett M.D. Attending, Orthopaedic Surgery  [facility]      Assessment:     Encounter Diagnoses   Name Primary? • Impingement syndrome of right ankle    • Sprain of unspecified ligament of right ankle, initial encounter Yes              Plan:     1. The patient has sustained a sprain of the right ATFL and possibly CFL- This injury occurred 7 months ago but the patient was unable to get into formal PT due to financial concerns. 2. Her MRI demonstrates peroneal and achilles tendonitis but her main issues seem to be stemming from the tremendous weakness in her ankle that has resulted from this injury. She has a negative anterior drawer  3. She does not have a surgical issue in her ankle. We have strongly recommended PT to begin her recovery from this injury. 4. She requested we continue her work restrictions but she will followup with Dr. Romayne Spina for any updates on her work status. No restrictions from my standpoint. Carmen Barnett MD        Subjective:    Chief Complaint:    Chief Complaint   Patient presents with   • Right Ankle - Pain        Whitney William is a 66 y.o. female referred by Dr. Romayne Spina for evaluation and treatment of an injury to the right ankle. This is evaluated as a personal injury. The injury occurred 7 month ago, and occurred while the patient was carrying a cooler into her home and inadvertently stepped on an unknown object. The patient states the ankle rolled inward at the time of injury. She did hear or sense a pop or snap at the time of the injury. The patient notes pain and severe swelling of the ankle since the injury. She has treated the ankle with ice, ace wrap, Elevation, Rest. She reports that she has done a home exercise program despite recommendation of formal PT 2/2 financial challenges. Pain is localized to the anterolateral  malleolar area.  She has not sprained this ankle in the past.    Pain/symptom location: the right ankle and foot  Pain/symptom quality: sharp  Pain/symptom severity: constant  Pain/symptom timing:  Worse during the day when active  Pain/symptom conext:  Worse with activites and work  Pain/symptom modifying factors:  Rest makes better, activities make worse. Pain/symptom associated signs/symptoms: none    Outside reports reviewed: none. Foot and Ankle Surgical History:   N/A     Past Medical, Surgical and Social History:  Past Medical History:  has a past medical history of Asthma, Fibromyalgia, and Head injury (10/05/2022). Problem List: does not have any pertinent problems on file. Past Surgical History:  has a past surgical history that includes Ankle surgery (Left, 2013); Tubal ligation; Carpal tunnel release; and Carpal tunnel release. Family History: family history is not on file. Social History:  reports that she has quit smoking. She has quit using smokeless tobacco. She reports current alcohol use. She reports that she does not use drugs. Current Medications: has a current medication list which includes the following prescription(s): amitriptyline, amitriptyline, atorvastatin, atorvastatin, benzonatate, celecoxib, cyanocobalamin, diphenhydramine, empagliflozin-metformin hcl, flovent hfa, fluticasone, folic acid, gabapentin, levocetirizine, lisinopril, magnesium, meclizine, montelukast, multivitamin, pantoprazole, paroxetine, rizatriptan, tizanidine, and trazodone. Allergies: is allergic to latex.      Review of Systems:  General- denies fever/chills  HEENT- denies hearing loss or sore throat  Eyes- denies eye pain or visual disturbances, denies red eyes  Respiratory- denies cough or SOB  Cardio- denies chest pain or palpitations  GI- denies abdominal pain  Endocrine- denies urinary frequency  Urinary- denies pain with urination  Musculoskeletal- Negative except noted above  Skin- denies rashes or wounds  Neurological- denies dizziness or headache  Psychiatric- denies anxiety or difficulty concentrating    Objective:        /93 (BP Location: Left arm, Patient Position: Sitting, Cuff Size: Adult)   Pulse 94   Ht 5' 6.5" (1.689 m)   Wt 95.3 kg (210 lb)   BMI 33.39 kg/m²   General/Constitutional: No apparent distress: well-nourished and well developed. Eyes: normal ocular motion  Lymphatic: No appreciable lymphadenopathy  Respiratory: Non-labored breathing  Vascular: No edema, swelling or tenderness, except as noted in detailed exam.  Integumentary: No impressive skin lesions present, except as noted in detailed exam.  Neuro: No ataxia or abnormal movements  Psych: Normal mood and affect, oriented to person, place and time. MSK: normal other than stated in HPI and exam      Gait:  Normal. The patient can bear weight on the injured extremity. Right Ankle  Proximal Fibula:   no tenderness noted   Edema: Moderate swelling anterolaterally in the ankle   Ecchymosis:   None   Crepitus  None   Active ROM:  100% of normal    Passive ROM:   100% of normal    Palpation:  Significant tenderness of the anterolateral ligaments   Stability.:   No joint laxity. Drawer sign equal to unaffected ankle. Syndosmosis:   syndesmotic ligament IS NOT tender   Sensation:    Intact in all distributions   Pulses:  normal DP and PT pulses   3/5 strength dorsiflexion, plantarflexion, inversion, and eversion of the right ankle. Full strength in left ankle for comparison. Imaging    X-ray of the ankle/foot: 3 views of the ankle obtained 1/29/2023 reveal a stable mortise joint, moderate soft tissue swelling, and no evidence of fracture. Reviewed by me personally. MRI of the right ankle demonstrate tendinosis of both the peroneus longus and achilles without evidence of tear. No fracture or dislocation. Reviewed by me personally. I personally performed this service.   Logan Webber MD

## 2023-07-12 ENCOUNTER — TELEPHONE (OUTPATIENT)
Dept: OBGYN CLINIC | Facility: HOSPITAL | Age: 58
End: 2023-07-12

## 2023-07-12 NOTE — TELEPHONE ENCOUNTER
Caller: Patient     Doctor: Matt Owens    Reason for call: Patient saw Dr. Sherif Payan yesterday as recommended by Dr. Matt Owens. She states that she needs to know how much longer she will be out of work. She does not think she can attend PT while working.      Call back # [phone]

## 2023-07-18 NOTE — TELEPHONE ENCOUNTER
Caller: patient    Doctor: kenory    Reason for call: calling to inform she now has PT scheduled and requesting work letter    Call back#: [phone]

## 2023-07-27 ENCOUNTER — EVALUATION (OUTPATIENT)
Dept: PHYSICAL THERAPY | Facility: CLINIC | Age: 58
End: 2023-07-27
Payer: COMMERCIAL

## 2023-07-27 DIAGNOSIS — S93.401D SPRAIN OF LIGAMENT OF RIGHT ANKLE, SUBSEQUENT ENCOUNTER: ICD-10-CM

## 2023-07-27 PROCEDURE — 97161 PT EVAL LOW COMPLEX 20 MIN: CPT | Performed by: PHYSICAL THERAPIST

## 2023-07-27 NOTE — PROGRESS NOTES
PT Evaluation     Today's date: 2023  Patient name: Elodia Perkins  : 1965  MRN: [id number]  Referring provider: Dirk Pineda,*  Dx:   Encounter Diagnosis     ICD-10-CM    1. Sprain of ligament of right ankle, subsequent encounter  S93.401D Ambulatory Referral to Physical Therapy          Start Time: 1405  Stop Time: 1505  Total time in clinic (min): 60 minutes    Assessment  Assessment details: Elodia Perkins is a 66 y.o. female who presents with h/o chronic right ankle pain beginning several months ago acutely when rolling her ankle. Patient describes feeling of instability, difficulty moving laterally, prolonged weightbearing activities and sleeping. Patient remains out of work at this time secondary to pain. Examination findings demonstrate painful ankle ROM, pain with resisted ankle ROM in all planes, impaired balance and tenderness to palpation over peroneals and Achilles tendon. Patient's clinical presentation is consistent with their referring diagnosis of peroneal and Achilles tendinosis. Patient would benefit from skilled physical therapy to address their aforementioned impairments, improve their level of function and to improve their overall quality of life. Impairments: activity intolerance, impaired balance, impaired physical strength, lacks appropriate home exercise program, pain with function, safety issue, weight-bearing intolerance and poor body mechanics  Understanding of Dx/Px/POC: excellent   Prognosis: good    Goals  Short Term Goals: to be achieved in 4 weeks  1) Patient to be independent with basic HEP. 2) Decrease pain at it's worst to 6/10 on VAS. 3) Increase LE strength by 1/2 MMT grade in all deficient planes. 4) Increase ankle ROM by > 5 deg in all deficient planes. 4) Patient to report decreased sleep interruption secondary to pain. 5) Increase ambulatory tolerance by 10 minutes.     Long Term Goals: to be achieved by discharge  1) FOTO equal to or greater than 56.  2) Patient to be independent with comprehensive HEP. 3) Abolish pain for improved quality of life. 4) Increase LE strength to 5/5 MMT grade in all planes to improve A/IADLS. 5) Patient to report no sleep interruption secondary to pain. 6) Ankle ROM within 5 deg of contralateral or WFL all planes to improve a/iadls. Plan  Plan details: Patient has expressed interest in transitioning to independent HEP d/t copay. Patient would benefit from: skilled PT  Planned modality interventions: biofeedback, cryotherapy, hydrotherapy, TENS, unattended electrical stimulation and low level laser therapy  Planned therapy interventions: activity modification, ADL retraining, balance, balance/weight bearing training, behavior modification, body mechanics training, functional ROM exercises, gait training, home exercise program, IADL retraining, joint mobilization, manual therapy, massage, neuromuscular re-education, patient education, strengthening, stretching, therapeutic activities, therapeutic exercise and transfer training  Frequency: 1-2x week. Duration in weeks: 12  Plan of Care beginning date: 7/27/2023  Plan of Care expiration date: 10/19/2023  Treatment plan discussed with: patient        Subjective Evaluation    History of Present Illness  Mechanism of injury: Patient reports that last fall she had stepped on a dog leash and rolled her right ankle hearing a pop. Patient fell to the ground and had increased pain when walking. Patient went tot he Urgent Care several days later where radiographic imaging r/o acute osseous abnormality. Patient later underwent an MRI of her right ankle, which identified mild peroneal longus and Achilles tendinosis. Patient performed a basic HEP but her pain continued to persist. Patient's pain has gradually worsened over the past several months.  Patient's pain is aggravated with excessive standing activity, lateral movements, resting the back of her ankle on the recliner, walking after prolonged sitting and laying on her right side. Patient's sleep is disrupted. Patient describes intermittent ankle instability. Patient denies experiencing crepitus, but does have intermittent snapping over the lateral aspect of her ankle. Patient has a h/o chronic tingling in her feet from her lower back, but denies acute changes. Patient estimates her right ankle overall level of function to be aproximately 50% of her premorbid norm. Patient Goals  Patient goal: decreased pain, improved activity tolerance  Pain  Current pain ratin  At best pain ratin  At worst pain rating: 10  Location: right ankle: anterolateral ; Achilles tendon  Quality: sharp  Alleviating factors: ice, Tylenol, Advil, Celebrex, walking barefoot. 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 Right Ankle/Foot   Negative for edema and effusion. Tenderness   Left Ankle/Foot   Tenderness in the talar dome. No tenderness in the Achilles insertion and peroneal tendon. Right Ankle/Foot   Tenderness in the Achilles insertion, peroneal tendon and talar dome.      Active Range of Motion   Left Ankle/Foot   Dorsiflexion (ke): 7 degrees   Dorsiflexion (kf): 5 degrees   Plantar flexion: 60 degrees   Inversion: 47 degrees   Eversion: 5 degrees   Great toe extension: 55 degrees     Right Ankle/Foot   Dorsiflexion (ke): 10 degrees   Dorsiflexion (kf): 7 degrees   Plantar flexion: 55 degrees   Inversion: 35 degrees   Eversion: 12 degrees   Great toe extension: 62 degrees     Passive Range of Motion   Left Ankle/Foot    Dorsiflexion (ke): 9 degrees   Dorsiflexion (kf): 10 degrees   Plantar flexion: 63 degrees   Inversion: 55 degrees   Eversion: 10 degrees   Great toe extension: 70 degrees     Right Ankle/Foot    Dorsiflexion (ke): 15 degrees Dorsiflexion (kf): 10 degrees    Plantar flexion: 57 degrees   Inversion: 44 degrees   Eversion: 20 degrees   Great toe extension: 70 degrees     Strength/Myotome Testing     Left Hip   Normal muscle strength    Right Hip   Normal muscle strength    Left Knee   Normal strength    Right Knee   Normal strength    Left Ankle/Foot   Normal strength    Right Ankle/Foot   Dorsiflexion: 4+ (+) pain  Plantar flexion: 4+ (+) pain, tested in NWB  Inversion: 4+ (+) pain  Eversion: 4+ (+) pain    Ambulation   Weight-Bearing Status   Weight-Bearing Status (Left): full weight bearing   Weight-Bearing Status (Right): full weight-bearing    Assistive device used: none    Ambulation: Level Surfaces   Ambulation without assistive device: independent    Observational Gait   Gait: within functional limits     Functional Assessment      Squat    Left within functional limits, right within functional limits and pain. Forward Step Up 8"   Left Leg  Within functional limits. Right Leg  Within functional limits. Single Leg Stance   Left: 5 seconds  Right: 3 seconds    Comments  Front step down 8" step: R: Mod I, fair eccentric control, (+) pain         POC expires Auth Status Unit limit Start date  Expiration date PT/OT + Visit Limit? [address] [place] [address]   10/19 N/A N/A 7/27 12/31 60 $30              Visit/Unit/FOTO Tracking  AUTH Status:  Date               N/A Used                Remaining                FOTO Intake: 39  FOTO Predicted: 56  Status:                Reevaluation:                        Precautions: Fibromyalgia, asthma      Manuals 7/27            Right gastroc str. IASTM Achilles tendon                                       Neuro Re-Ed             SLS             Tandem stance             Biodex: heel-toe rocking, L5                                                                 Ther Ex             Patient education: pathophysiology, diagnostic imaging review,  GR            Runner's str. (gastroc, soleus)             HR             4-way TB                                                                 Ther Activity             FSD                          Gait Training                                       Modalities

## 2023-08-01 ENCOUNTER — OFFICE VISIT (OUTPATIENT)
Dept: PHYSICAL THERAPY | Facility: CLINIC | Age: 58
End: 2023-08-01
Payer: COMMERCIAL

## 2023-08-01 DIAGNOSIS — S93.401D SPRAIN OF LIGAMENT OF RIGHT ANKLE, SUBSEQUENT ENCOUNTER: Primary | ICD-10-CM

## 2023-08-01 PROCEDURE — 97110 THERAPEUTIC EXERCISES: CPT | Performed by: PHYSICAL THERAPIST

## 2023-08-01 NOTE — PROGRESS NOTES
Daily Note     Today's date: 2023  Patient name: Danuta Darby  : 1965  MRN: [id number]  Referring provider: Danny Travis,*  Dx:   Encounter Diagnosis     ICD-10-CM    1. Sprain of ligament of right ankle, subsequent encounter  S93.401D           Start Time: 725  Stop Time: 08  Total time in clinic (min): 51 minutes    Subjective: Patient reports no significant changes to overall status since her IE (previous treatment session). Objective: See treatment diary below      Assessment: Tolerated treatment fair. Patient demonstrated fatigue post treatment and would benefit from continued PT. Patient reported pain with soleus stretching over her anterior ankle. Patient otherwise tolerated treatment well. Plan: Continue per plan of care. Progress treatment as tolerated. POC expires Auth Status Unit limit Start date  Expiration date PT/OT + Visit Limit? [address] [place] [address]   10/19 N/A N/A  60 $30              Visit/Unit/FOTO Tracking  AUTH Status:  Date              N/A Used 1 2              Remaining  59 62             FOTO Intake: 39  FOTO Predicted: 56  Status:                Reevaluation:                     Precautions: Fibromyalgia, asthma      Manuals            Right gastroc str. IASTM Achilles tendon                                       Neuro Re-Ed             SLS             Tandem stance             Biodex: heel-toe rocking, L5  3 min                                                               Ther Ex             Patient education: pathophysiology, diagnostic imaging review,  GR HEP review           Runner's str. (gastroc, soleus)  3x30" gastroc only    soleus P! HR  3x10           4-way TB  RTB 20x ea.             Nustep: LE only  L8 10 min           Prostretch  3x30"                                     Ther Activity             FSD                          Gait Training Modalities

## 2023-08-03 NOTE — PROGRESS NOTES
Daily Note     Today's date: 2023  Patient name: Elodia Perkins  : 1965  MRN: [id number]  Referring provider: Dirk Pineda,*  Dx:   Encounter Diagnosis     ICD-10-CM    1. Sprain of ligament of right ankle, subsequent encounter  S93.401D           Start Time: 734  Stop Time: 08  Total time in clinic (min): 46 minutes    Subjective: Patient reports that she lifted a heavy safe and her hips are now painful/sore. Patient felt mild to moderate soreness after her last visit. Objective: See treatment diary below      Assessment: Tolerated treatment well. Patient demonstrated fatigue post treatment and would benefit from continued PT. Updated and reviewed HEP. Patient reported soreness in Achilles and calf with soft tissue mobilization, but reported decreased tension afterwards. Plan: Continue per plan of care. Progress treatment as tolerated. POC expires Auth Status Unit limit Start date  Expiration date PT/OT + Visit Limit? [address] [place] [address]   10/19 N/A N/A  60 $30              Visit/Unit/FOTO Tracking  AUTH Status:  Date             N/A Used 1 2 3             Remaining  59 58 62            FOTO Intake: 39  FOTO Predicted: 56  Status:                Reevaluation:                     Precautions: Fibromyalgia, asthma      Manuals           Right gastroc str. IASTM Achilles tendon   GR          STM gastroc with Theraband roller   GR                       Neuro Re-Ed             SLS   Reviewed          Tandem stance   Reviewed          Biodex: heel-toe rocking, L5  3 min           Standing hip abd, ext in SLS off 2" step   Reviewed                                                 Ther Ex             Patient education: pathophysiology, diagnostic imaging review,  GR HEP review HEP review, IASTM review    GR          Runner's str. (gastroc, soleus)  3x30" gastroc only    soleus P! HR  3x10           4-way TB  RTB 20x ea. Nustep: LE only  L8 10 min L8 10 min          Prostretch  3x30" 3x30"                                    Ther Activity             FSD   Reviewed          Stationary lunge   Reviewed          Forward, lateral lunges over hurdles             Gait Training                                       Modalities                                       Access Code: [email or web address]  URL: [email or web address]  Date: 08/04/2023  Prepared by: Ulice Pole    Exercises  - Gastroc Stretch on Wall  - 1 x daily - 7 x weekly - 3 sets - 1 reps - 30 seconds hold  - Standing Soleus Stretch  - 1 x daily - 7 x weekly - 3 sets - 1 reps - 30 seconds hold  - Heel Raises with Counter Support  - 1 x daily - 4 x weekly - 3 sets - 10 reps  - Long Sitting Ankle Plantar Flexion with Resistance  - 1 x daily - 4 x weekly - 3 sets - 10 reps  - Long Sitting Ankle Dorsiflexion with Anchored Resistance  - 1 x daily - 4 x weekly - 3 sets - 10 reps  - Long Sitting Ankle Inversion with Anchored Resistance  - 1 x daily - 4 x weekly - 3 sets - 10 reps  - Long Sitting Ankle Eversion with Resistance  - 1 x daily - 4 x weekly - 3 sets - 10 reps  - Single Leg Stance  - 1 x daily - 4 x weekly - 1 sets - 10 reps - 10 seconds hold  - Tandem Stance with Support  - 1 x daily - 4 x weekly - 1 sets - 10 reps - 10 seconds hold  - Reverse Lunge  - 1 x daily - 4 x weekly - 2 sets - 10 reps  - Forward Step Down Touch with Heel  - 1 x daily - 4 x weekly - 2 sets - 10 reps  - Standing Repeated Hip Abduction with Resistance  - 1 x daily - 4 x weekly - 2 sets - 10 reps  - Hip Extension with Resistance Loop  - 1 x daily - 4 x weekly - 2 sets - 10 reps

## 2023-08-04 ENCOUNTER — OFFICE VISIT (OUTPATIENT)
Dept: PHYSICAL THERAPY | Facility: CLINIC | Age: 58
End: 2023-08-04
Payer: COMMERCIAL

## 2023-08-04 DIAGNOSIS — S93.401D SPRAIN OF LIGAMENT OF RIGHT ANKLE, SUBSEQUENT ENCOUNTER: Primary | ICD-10-CM

## 2023-08-04 PROCEDURE — 97112 NEUROMUSCULAR REEDUCATION: CPT | Performed by: PHYSICAL THERAPIST

## 2023-08-04 PROCEDURE — 97110 THERAPEUTIC EXERCISES: CPT | Performed by: PHYSICAL THERAPIST

## 2023-08-04 PROCEDURE — 97140 MANUAL THERAPY 1/> REGIONS: CPT | Performed by: PHYSICAL THERAPIST

## 2023-08-16 ENCOUNTER — APPOINTMENT (OUTPATIENT)
Dept: PHYSICAL THERAPY | Facility: CLINIC | Age: 58
End: 2023-08-16
Payer: COMMERCIAL

## 2023-08-22 ENCOUNTER — OFFICE VISIT (OUTPATIENT)
Dept: PHYSICAL THERAPY | Facility: CLINIC | Age: 58
End: 2023-08-22
Payer: COMMERCIAL

## 2023-08-22 DIAGNOSIS — S93.401D SPRAIN OF LIGAMENT OF RIGHT ANKLE, SUBSEQUENT ENCOUNTER: Primary | ICD-10-CM

## 2023-08-22 PROCEDURE — 97110 THERAPEUTIC EXERCISES: CPT | Performed by: PHYSICAL THERAPIST

## 2023-08-22 PROCEDURE — 97140 MANUAL THERAPY 1/> REGIONS: CPT | Performed by: PHYSICAL THERAPIST

## 2023-08-22 NOTE — PROGRESS NOTES
Daily Note     Today's date: 2023  Patient name: Michelle Childress  : 1965  MRN: [id number]  Referring provider: Jonathan Davalos,*  Dx:   Encounter Diagnosis     ICD-10-CM    1. Sprain of ligament of right ankle, subsequent encounter  S93.401D           Start Time: 725  Stop Time: 08  Total time in clinic (min): 43 minutes    Subjective: Patient reports that her ankle is not feeling good this morning. Patient believes she stepped the wrong way and aggravated her ankle. Patient feels independent with her current HEP and would like to be placed on hold at this time. Objective: See treatment diary below      Assessment: Tolerated treatment well. Patient demonstrated fatigue post treatment and would benefit from continued PT. Reviewed HEP and answered all questions to patient's satisfaction. Emphasized manual soft tissue techniques secondary to Patient's recent exacerbation of pain. Plan: Continue per plan of care. Progress treatment as tolerated. Patient has requested to be placed on hold until further notice. POC expires Auth Status Unit limit Start date  Expiration date PT/OT + Visit Limit? [address] [place] [address]   10/19 N/A N/A  60 $30              Visit/Unit/FOTO Tracking  AUTH Status:  Date            N/A Used 1 2 3 4            Remaining  59 58 57 64           FOTO Intake: 39  FOTO Predicted: 56  Status:                Reevaluation:                     Precautions: Fibromyalgia, asthma      Manuals          Right gastroc str.     GR         IASTM Achilles tendon   GR GR         STM gastroc with Theraband roller   GR GR                      Neuro Re-Ed             SLS   Reviewed          Tandem stance   Reviewed          Biodex: heel-toe rocking, L5  3 min           Standing hip abd, ext in SLS off 2" step   Reviewed                                                 Ther Ex             Patient education: pathophysiology, diagnostic imaging review,  GR HEP review HEP review, IAS review    GR HEP review         Runner's str. (gastroc, soleus)  3x30" gastroc only    soleus P! HR  3x10           4-way TB  RTB 20x ea. Nustep: LE only  L8 10 min L8 10 min 10 min         Prostretch  3x30" 3x30"                                    Ther Activity             FSD   Reviewed          Stationary lunge   Reviewed          Forward, lateral lunges over hurdles             Gait Training                                       Modalities                                       Access Code: [email or web address]  URL: [email or web address]  Date: 08/04/2023  Prepared by: Allyson Client    Exercises  - Gastroc Stretch on Wall  - 1 x daily - 7 x weekly - 3 sets - 1 reps - 30 seconds hold  - Standing Soleus Stretch  - 1 x daily - 7 x weekly - 3 sets - 1 reps - 30 seconds hold  - Heel Raises with Counter Support  - 1 x daily - 4 x weekly - 3 sets - 10 reps  - Long Sitting Ankle Plantar Flexion with Resistance  - 1 x daily - 4 x weekly - 3 sets - 10 reps  - Long Sitting Ankle Dorsiflexion with Anchored Resistance  - 1 x daily - 4 x weekly - 3 sets - 10 reps  - Long Sitting Ankle Inversion with Anchored Resistance  - 1 x daily - 4 x weekly - 3 sets - 10 reps  - Long Sitting Ankle Eversion with Resistance  - 1 x daily - 4 x weekly - 3 sets - 10 reps  - Single Leg Stance  - 1 x daily - 4 x weekly - 1 sets - 10 reps - 10 seconds hold  - Tandem Stance with Support  - 1 x daily - 4 x weekly - 1 sets - 10 reps - 10 seconds hold  - Reverse Lunge  - 1 x daily - 4 x weekly - 2 sets - 10 reps  - Forward Step Down Touch with Heel  - 1 x daily - 4 x weekly - 2 sets - 10 reps  - Standing Repeated Hip Abduction with Resistance  - 1 x daily - 4 x weekly - 2 sets - 10 reps  - Hip Extension with Resistance Loop  - 1 x daily - 4 x weekly - 2 sets - 10 reps

## 2023-08-24 ENCOUNTER — HOSPITAL ENCOUNTER (OUTPATIENT)
Dept: MAMMOGRAPHY | Facility: CLINIC | Age: 58
End: 2023-08-24
Payer: COMMERCIAL

## 2023-08-24 DIAGNOSIS — Z12.31 ENCOUNTER FOR SCREENING MAMMOGRAM FOR MALIGNANT NEOPLASM OF BREAST: ICD-10-CM

## 2023-08-24 PROCEDURE — 77063 BREAST TOMOSYNTHESIS BI: CPT

## 2023-08-24 PROCEDURE — 77067 SCR MAMMO BI INCL CAD: CPT

## 2023-08-29 ENCOUNTER — TELEPHONE (OUTPATIENT)
Age: 58
End: 2023-08-29

## 2023-08-29 NOTE — TELEPHONE ENCOUNTER
Caller: Allen Dawson    Doctor/Office: Alfredito    CB#: [phone]    Work or school note: Went back to work today but being on her feet for 5 hours has been very painful,woyuld like to know if you can give her a note for modified duty. She said she could do 4-5 hours on her feet and then maybe 5 hours doing something else.     Return to work/school date: NA

## 2023-09-28 ENCOUNTER — TELEPHONE (OUTPATIENT)
Dept: NEUROLOGY | Facility: CLINIC | Age: 58
End: 2023-09-28

## 2023-09-28 NOTE — TELEPHONE ENCOUNTER
Patient has declined to re-schedule at this time due to work, Called and spoke to patient.        Thank you,     Landon Graham

## 2023-10-09 ENCOUNTER — OFFICE VISIT (OUTPATIENT)
Dept: URGENT CARE | Facility: CLINIC | Age: 58
End: 2023-10-09
Payer: COMMERCIAL

## 2023-10-09 ENCOUNTER — APPOINTMENT (OUTPATIENT)
Dept: RADIOLOGY | Facility: CLINIC | Age: 58
End: 2023-10-09
Payer: COMMERCIAL

## 2023-10-09 VITALS
DIASTOLIC BLOOD PRESSURE: 78 MMHG | TEMPERATURE: 97.3 F | SYSTOLIC BLOOD PRESSURE: 136 MMHG | HEART RATE: 106 BPM | OXYGEN SATURATION: 95 % | RESPIRATION RATE: 16 BRPM

## 2023-10-09 DIAGNOSIS — S90.122A CONTUSION OF LEFT LESSER TOE(S) W/O DAMAGE TO NAIL, INIT: ICD-10-CM

## 2023-10-09 DIAGNOSIS — S90.122A CONTUSION OF LEFT LESSER TOE(S) W/O DAMAGE TO NAIL, INIT: Primary | ICD-10-CM

## 2023-10-09 PROCEDURE — 99213 OFFICE O/P EST LOW 20 MIN: CPT | Performed by: PHYSICIAN ASSISTANT

## 2023-10-09 PROCEDURE — 73660 X-RAY EXAM OF TOE(S): CPT

## 2023-10-09 NOTE — LETTER
October 9, 2023     Patient: Malcolm Rothman  YOB: 1965  Date of Visit: 10/9/2023      To Whom it May Concern:    Shawn Gonzalez is under my professional care. Saranya Akbar was seen in my office on 10/9/2023. If you have any questions or concerns, please don't hesitate to call.          Sincerely,          TRINITY Umaña        CC: No Recipients

## 2023-10-09 NOTE — PROGRESS NOTES
[facility]        NAME: Puja Lopez is a 66 y.o. female  : 1965    MRN: [id number]  DATE: 2023  TIME: 10:03 AM    Assessment and Plan   Contusion of left lesser toe(s) w/o damage to nail, init [S90.122A]  1. Contusion of left lesser toe(s) w/o damage to nail, init  XR toe left fourth min 2 views            Patient Instructions   There are no Patient Instructions on file for this visit. Follow up with PCP in 3-5 days. Proceed to  ER if symptoms worsen. Chief Complaint     Chief Complaint   Patient presents with   • Foot Pain     Left foot 4th toe injury. Dog dropped a bone on foot. Swelling and pain, gets more swollen throughout the day. 9/10 pain. Pressure and standing makes pain worse. History of Present Illness       Patient presents with left fourth toe pain after her dog dropped a bone on it last week. Swelling and pain is persisted. Can walk on it but causes worsening of pain. Denies numbness/tingling. Review of Systems   Review of Systems   Musculoskeletal: Positive for arthralgias and joint swelling. Skin: Positive for color change. Neurological: Negative for weakness and numbness.          Current Medications       Current Outpatient Medications:   •  amitriptyline (ELAVIL) 25 mg tablet, Take 2 tablets (50 mg total) by mouth daily at bedtime, Disp: 60 tablet, Rfl: 6  •  amitriptyline (ELAVIL) 50 mg tablet, , Disp: , Rfl:   •  atorvastatin (LIPITOR) 20 mg tablet, Take 10 mg by mouth daily, Disp: , Rfl:   •  atorvastatin (LIPITOR) 40 mg tablet, , Disp: , Rfl:   •  celecoxib (CeleBREX) 200 mg capsule, , Disp: , Rfl:   •  cyanocobalamin (VITAMIN B-12) 100 mcg tablet, Take by mouth daily, Disp: , Rfl:   •  diphenhydrAMINE (BENADRYL) 25 mg tablet, Take 25 mg by mouth every 6 (six) hours as needed for itching, Disp: , Rfl:   •  Empagliflozin-metFORMIN HCl (SYNJARDY PO), Take by mouth, Disp: , Rfl:   •  Flovent  MCG/ACT inhaler, , Disp: , Rfl:   • fluticasone (FLONASE) 50 mcg/act nasal spray, 1 spray into each nostril daily, Disp: 16 g, Rfl: 0  •  folic acid (FOLVITE) 034 mcg tablet, Take 400 mcg by mouth daily, Disp: , Rfl:   •  gabapentin (NEURONTIN) 600 MG tablet, Take 600 mg by mouth 2 (two) times a day, Disp: , Rfl:   •  levocetirizine (XYZAL) 5 MG tablet, Take 5 mg by mouth, Disp: , Rfl:   •  lisinopril (ZESTRIL) 2.5 mg tablet, Take 2.5 mg by mouth daily  , Disp: , Rfl:   •  magnesium 30 MG tablet, Take 30 mg by mouth 2 (two) times a day, Disp: , Rfl:   •  meclizine (ANTIVERT) 25 mg tablet, Take 1 tablet (25 mg total) by mouth 3 (three) times a day as needed for dizziness, Disp: 30 tablet, Rfl: 0  •  montelukast (SINGULAIR) 10 mg tablet, Take 10 mg by mouth daily at bedtime  , Disp: , Rfl:   •  Multiple Vitamin (multivitamin) capsule, Take 1 capsule by mouth daily, Disp: , Rfl:   •  pantoprazole (PROTONIX) 40 mg tablet, , Disp: , Rfl:   •  PARoxetine (PAXIL) 10 mg tablet, Take 10 mg by mouth every evening, Disp: , Rfl:   •  rizatriptan (MAXALT-MLT) 10 mg disintegrating tablet, Take 1 tablet (10 mg total) by mouth once as needed for migraine for up to 1 dose May repeat in 2 hours if needed, Disp: 10 tablet, Rfl: 6  •  TiZANidine (ZANAFLEX) 2 MG capsule, Take 2 mg by mouth daily as needed for muscle spasms  , Disp: , Rfl:   •  traZODone (DESYREL) 100 mg tablet, TAKE 1 TABLET NIGHTLY, Disp: , Rfl:   •  benzonatate (TESSALON PERLES) 100 mg capsule, Take 1 capsule (100 mg total) by mouth every 8 (eight) hours (Patient not taking: Reported on 10/9/2023), Disp: 21 capsule, Rfl: 0    Current Allergies     Allergies as of 10/09/2023 - Reviewed 10/09/2023   Allergen Reaction Noted   • Latex  01/13/2020            The following portions of the patient's history were reviewed and updated as appropriate: allergies, current medications, past family history, past medical history, past social history, past surgical history and problem list.     Past Medical History: Diagnosis Date   • Asthma    • Fibromyalgia    • Head injury 10/05/2022   • Hx of hysterectomy        Past Surgical History:   Procedure Laterality Date   • ANKLE SURGERY Left 2013   • CARPAL TUNNEL RELEASE     • CARPAL TUNNEL RELEASE     • TUBAL LIGATION         Family History   Problem Relation Age of Onset   • No Known Problems Mother    • No Known Problems Sister    • No Known Problems Sister    • No Known Problems Daughter    • Breast cancer Maternal Grandmother 95   • No Known Problems Paternal Grandmother    • Leukemia Maternal Aunt 64   • No Known Problems Paternal Aunt    • No Known Problems Paternal Aunt    • No Known Problems Paternal Aunt          Medications have been verified. Objective   /78   Pulse (!) 106   Temp (!) 97.3 °F (36.3 °C)   Resp 16   SpO2 95%        Physical Exam     Physical Exam  Constitutional:       Appearance: Normal appearance. Cardiovascular:      Pulses: Normal pulses. Musculoskeletal:         General: Tenderness present. No swelling or deformity. Normal range of motion. Comments: Mild ecchymosis and swelling with tenderness to palpation over the left fourth toe. No crepitus, deformity noted. Neurovascularly intact distally. Skin:     General: Skin is warm. Capillary Refill: Capillary refill takes less than 2 seconds. Findings: Bruising present. Neurological:      Mental Status: She is alert.    Psychiatric:         Mood and Affect: Mood normal.         Behavior: Behavior normal.

## 2023-12-09 DIAGNOSIS — Z87.820 HISTORY OF CONCUSSION: ICD-10-CM

## 2023-12-09 DIAGNOSIS — G44.309 POST-TRAUMATIC HEADACHE: ICD-10-CM

## 2023-12-09 DIAGNOSIS — G43.109 MIGRAINE WITH AURA AND WITHOUT STATUS MIGRAINOSUS, NOT INTRACTABLE: ICD-10-CM

## 2023-12-10 RX ORDER — AMITRIPTYLINE HYDROCHLORIDE 25 MG/1
50 TABLET, FILM COATED ORAL
Qty: 60 TABLET | Refills: 6 | Status: SHIPPED | OUTPATIENT
Start: 2023-12-10

## 2023-12-19 ENCOUNTER — OFFICE VISIT (OUTPATIENT)
Dept: URGENT CARE | Facility: CLINIC | Age: 58
End: 2023-12-19
Payer: COMMERCIAL

## 2023-12-19 VITALS
RESPIRATION RATE: 18 BRPM | HEART RATE: 93 BPM | OXYGEN SATURATION: 97 % | DIASTOLIC BLOOD PRESSURE: 64 MMHG | HEIGHT: 67 IN | SYSTOLIC BLOOD PRESSURE: 120 MMHG | WEIGHT: 200 LBS | TEMPERATURE: 97.8 F | BODY MASS INDEX: 31.39 KG/M2

## 2023-12-19 DIAGNOSIS — J06.9 ACUTE URI: Primary | ICD-10-CM

## 2023-12-19 DIAGNOSIS — R06.2 WHEEZING: ICD-10-CM

## 2023-12-19 PROCEDURE — 99213 OFFICE O/P EST LOW 20 MIN: CPT | Performed by: PHYSICAL MEDICINE & REHABILITATION

## 2023-12-19 RX ORDER — OXYBUTYNIN CHLORIDE 5 MG/1
5 TABLET, EXTENDED RELEASE ORAL DAILY
COMMUNITY
Start: 2023-07-21 | End: 2024-07-20

## 2023-12-19 RX ORDER — ESOMEPRAZOLE MAGNESIUM 40 MG/1
40 CAPSULE, DELAYED RELEASE ORAL
COMMUNITY

## 2023-12-19 RX ORDER — CLOTRIMAZOLE 1 %
1 CREAM (GRAM) TOPICAL 2 TIMES DAILY
COMMUNITY
Start: 2023-07-21 | End: 2024-07-20

## 2023-12-19 RX ORDER — PREDNISONE 20 MG/1
40 TABLET ORAL DAILY
Qty: 10 TABLET | Refills: 0 | Status: SHIPPED | OUTPATIENT
Start: 2023-12-19 | End: 2023-12-24

## 2023-12-19 RX ORDER — SEMAGLUTIDE 0.68 MG/ML
0.25 INJECTION, SOLUTION SUBCUTANEOUS WEEKLY
COMMUNITY
Start: 2023-11-26

## 2023-12-19 RX ORDER — BUSPIRONE HYDROCHLORIDE 5 MG/1
5 TABLET ORAL 2 TIMES DAILY PRN
COMMUNITY
Start: 2023-07-21 | End: 2024-07-20

## 2023-12-19 NOTE — PATIENT INSTRUCTIONS
Please follow up with your PCP within 3-5 days.    May use over the counter decongestants, cough suppressants as needed.

## 2023-12-19 NOTE — PROGRESS NOTES
[facility]        NAME: Marlena Dave is a 58 y.o. female  : 1965    MRN: [id number]  DATE: 2023  TIME: 10:38 AM    Assessment and Plan   Acute URI [J06.9]  1. Acute URI        2. Wheezing  predniSONE 20 mg tablet            Patient Instructions       Follow up with PCP in 3-5 days.  Proceed to  ER if symptoms worsen.    Chief Complaint     Chief Complaint   Patient presents with    Cold Like Symptoms     PT reports productive cough, congestion, pnd, sore throat, headache, chest congestion x4 days. Pt had a negative home covid test yesterday.          History of Present Illness       Patient presenting with a productive cough, congestion, post-nasal drip, sore throat, headache, chest congestion for 4 days. She took an at home COVID test yesterday which was negative.        Review of Systems   Review of Systems   Constitutional: Negative.  Negative for chills, fatigue and fever.   HENT:  Positive for congestion, postnasal drip and sore throat.    Respiratory:  Positive for cough. Negative for shortness of breath.    Cardiovascular: Negative.    Gastrointestinal: Negative.  Negative for abdominal pain, diarrhea, nausea and vomiting.   Neurological:  Positive for headaches.         Current Medications       Current Outpatient Medications:     amitriptyline (ELAVIL) 25 mg tablet, take 2 tablets by mouth at bedtime, Disp: 60 tablet, Rfl: 6    amitriptyline (ELAVIL) 50 mg tablet, , Disp: , Rfl:     atorvastatin (LIPITOR) 20 mg tablet, Take 10 mg by mouth daily, Disp: , Rfl:     atorvastatin (LIPITOR) 40 mg tablet, , Disp: , Rfl:     busPIRone (BUSPAR) 5 mg tablet, Take 5 mg by mouth 2 (two) times a day as needed, Disp: , Rfl:     celecoxib (CeleBREX) 200 mg capsule, , Disp: , Rfl:     clotrimazole (LOTRIMIN) 1 % cream, Apply 1 application. topically 2 (two) times a day, Disp: , Rfl:     cyanocobalamin (VITAMIN B-12) 100 mcg tablet, Take by mouth daily, Disp: , Rfl:     diphenhydrAMINE  (BENADRYL) 25 mg tablet, Take 25 mg by mouth every 6 (six) hours as needed for itching, Disp: , Rfl:     Empagliflozin-metFORMIN HCl (SYNJARDY PO), Take by mouth, Disp: , Rfl:     esomeprazole (NexIUM) 40 MG capsule, Take 40 mg by mouth daily before breakfast, Disp: , Rfl:     Flovent  MCG/ACT inhaler, , Disp: , Rfl:     fluticasone (FLONASE) 50 mcg/act nasal spray, 1 spray into each nostril daily, Disp: 16 g, Rfl: 0    folic acid (FOLVITE) 400 mcg tablet, Take 400 mcg by mouth daily, Disp: , Rfl:     gabapentin (NEURONTIN) 600 MG tablet, Take 600 mg by mouth 2 (two) times a day, Disp: , Rfl:     lisinopril (ZESTRIL) 2.5 mg tablet, Take 2.5 mg by mouth daily  , Disp: , Rfl:     magnesium 30 MG tablet, Take 30 mg by mouth 2 (two) times a day, Disp: , Rfl:     meclizine (ANTIVERT) 25 mg tablet, Take 1 tablet (25 mg total) by mouth 3 (three) times a day as needed for dizziness, Disp: 30 tablet, Rfl: 0    montelukast (SINGULAIR) 10 mg tablet, Take 10 mg by mouth daily at bedtime  , Disp: , Rfl:     Multiple Vitamin (multivitamin) capsule, Take 1 capsule by mouth daily, Disp: , Rfl:     oxybutynin (DITROPAN-XL) 5 mg 24 hr tablet, Take 5 mg by mouth daily, Disp: , Rfl:     Ozempic, 0.25 or 0.5 MG/DOSE, 2 MG/3ML injection pen, Inject 0.25 mg under the skin Once a week, Disp: , Rfl:     pantoprazole (PROTONIX) 40 mg tablet, , Disp: , Rfl:     PARoxetine (PAXIL) 10 mg tablet, Take 10 mg by mouth every evening, Disp: , Rfl:     predniSONE 20 mg tablet, Take 2 tablets (40 mg total) by mouth daily for 5 days, Disp: 10 tablet, Rfl: 0    rizatriptan (MAXALT-MLT) 10 mg disintegrating tablet, Take 1 tablet (10 mg total) by mouth once as needed for migraine for up to 1 dose May repeat in 2 hours if needed, Disp: 10 tablet, Rfl: 6    TiZANidine (ZANAFLEX) 2 MG capsule, Take 2 mg by mouth daily as needed for muscle spasms  , Disp: , Rfl:     traZODone (DESYREL) 100 mg tablet, TAKE 1 TABLET NIGHTLY, Disp: , Rfl:     benzonatate  "(TESSALON PERLES) 100 mg capsule, Take 1 capsule (100 mg total) by mouth every 8 (eight) hours (Patient not taking: Reported on 10/9/2023), Disp: 21 capsule, Rfl: 0    levocetirizine (XYZAL) 5 MG tablet, Take 5 mg by mouth, Disp: , Rfl:     Current Allergies     Allergies as of 12/19/2023 - Reviewed 12/19/2023   Allergen Reaction Noted    Latex  01/13/2020            The following portions of the patient's history were reviewed and updated as appropriate: allergies, current medications, past family history, past medical history, past social history, past surgical history and problem list.     Past Medical History:   Diagnosis Date    Anxiety     Asthma     Jose Dias infection     Fibromyalgia     GERD (gastroesophageal reflux disease)     Head injury 10/05/2022    Hx of hysterectomy     Insomnia     Overactive bladder        Past Surgical History:   Procedure Laterality Date    ANKLE SURGERY Left 2013    CARPAL TUNNEL RELEASE      CARPAL TUNNEL RELEASE      HYSTERECTOMY      TUBAL LIGATION         Family History   Problem Relation Age of Onset    No Known Problems Mother     No Known Problems Sister     No Known Problems Sister     No Known Problems Daughter     Breast cancer Maternal Grandmother 95    No Known Problems Paternal Grandmother     Leukemia Maternal Aunt 56    No Known Problems Paternal Aunt     No Known Problems Paternal Aunt     No Known Problems Paternal Aunt          Medications have been verified.        Objective   /64   Pulse 93   Temp 97.8 °F (36.6 °C) (Temporal)   Resp 18   Ht 5' 6.5\" (1.689 m)   Wt 90.7 kg (200 lb)   SpO2 97%   BMI 31.80 kg/m²        Physical Exam     Physical Exam  Constitutional:       General: She is not in acute distress.     Appearance: She is ill-appearing.   HENT:      Right Ear: Tympanic membrane normal.      Left Ear: Tympanic membrane normal.      Nose: Congestion present. No rhinorrhea.      Mouth/Throat:      Mouth: Mucous membranes are moist.      " Pharynx: Oropharynx is clear. No oropharyngeal exudate or posterior oropharyngeal erythema.   Eyes:      Conjunctiva/sclera: Conjunctivae normal.   Cardiovascular:      Rate and Rhythm: Normal rate and regular rhythm.      Heart sounds: Normal heart sounds.   Pulmonary:      Effort: Pulmonary effort is normal. No respiratory distress.      Breath sounds: Rhonchi present. No wheezing or rales.   Musculoskeletal:      Cervical back: Normal range of motion and neck supple.   Lymphadenopathy:      Cervical: Cervical adenopathy present.   Skin:     General: Skin is warm.   Neurological:      Mental Status: She is alert.   Psychiatric:         Mood and Affect: Mood normal.         Behavior: Behavior normal.

## 2023-12-19 NOTE — LETTER
December 19, 2023     Patient: Marlena Dave   YOB: 1965   Date of Visit: 12/19/2023       To Whom it May Concern:    Marlena Dave was seen in my clinic on 12/19/2023. She may return to work on 12/21/23 .    If you have any questions or concerns, please don't hesitate to call.         Sincerely,          Jillian Galarza PA-C        CC: No Recipients

## 2024-04-26 DIAGNOSIS — Z00.6 ENCOUNTER FOR EXAMINATION FOR NORMAL COMPARISON OR CONTROL IN CLINICAL RESEARCH PROGRAM: ICD-10-CM

## 2024-05-08 ENCOUNTER — APPOINTMENT (OUTPATIENT)
Dept: URGENT CARE | Age: 59
End: 2024-05-08
Payer: OTHER MISCELLANEOUS

## 2024-05-08 PROCEDURE — G0382 LEV 3 HOSP TYPE B ED VISIT: HCPCS

## 2024-05-08 PROCEDURE — 99283 EMERGENCY DEPT VISIT LOW MDM: CPT

## 2024-06-14 ENCOUNTER — APPOINTMENT (OUTPATIENT)
Dept: URGENT CARE | Age: 59
End: 2024-06-14
Payer: OTHER MISCELLANEOUS

## 2024-06-14 PROCEDURE — 99213 OFFICE O/P EST LOW 20 MIN: CPT

## 2024-06-27 ENCOUNTER — APPOINTMENT (OUTPATIENT)
Dept: URGENT CARE | Age: 59
End: 2024-06-27
Payer: OTHER MISCELLANEOUS

## 2024-06-27 ENCOUNTER — APPOINTMENT (OUTPATIENT)
Dept: LAB | Age: 59
End: 2024-06-27

## 2024-06-27 DIAGNOSIS — Z00.6 ENCOUNTER FOR EXAMINATION FOR NORMAL COMPARISON OR CONTROL IN CLINICAL RESEARCH PROGRAM: ICD-10-CM

## 2024-06-27 PROCEDURE — 99213 OFFICE O/P EST LOW 20 MIN: CPT | Performed by: STUDENT IN AN ORGANIZED HEALTH CARE EDUCATION/TRAINING PROGRAM

## 2024-06-27 PROCEDURE — 36415 COLL VENOUS BLD VENIPUNCTURE: CPT

## 2024-07-07 DIAGNOSIS — Z87.820 HISTORY OF CONCUSSION: ICD-10-CM

## 2024-07-07 DIAGNOSIS — G43.109 MIGRAINE WITH AURA AND WITHOUT STATUS MIGRAINOSUS, NOT INTRACTABLE: ICD-10-CM

## 2024-07-07 DIAGNOSIS — G44.309 POST-TRAUMATIC HEADACHE: ICD-10-CM

## 2024-07-10 LAB
APOB+LDLR+PCSK9 GENE MUT ANL BLD/T: NOT DETECTED
BRCA1+BRCA2 DEL+DUP + FULL MUT ANL BLD/T: NOT DETECTED
MLH1+MSH2+MSH6+PMS2 GN DEL+DUP+FUL M: NOT DETECTED

## 2024-07-18 ENCOUNTER — APPOINTMENT (OUTPATIENT)
Dept: URGENT CARE | Age: 59
End: 2024-07-18
Payer: OTHER MISCELLANEOUS

## 2024-07-18 PROCEDURE — 99213 OFFICE O/P EST LOW 20 MIN: CPT | Performed by: STUDENT IN AN ORGANIZED HEALTH CARE EDUCATION/TRAINING PROGRAM

## 2024-07-24 RX ORDER — AMITRIPTYLINE HYDROCHLORIDE 25 MG/1
50 TABLET, FILM COATED ORAL
Qty: 60 TABLET | Refills: 6 | Status: SHIPPED | OUTPATIENT
Start: 2024-07-24

## 2024-08-07 ENCOUNTER — TELEPHONE (OUTPATIENT)
Dept: NEUROLOGY | Facility: CLINIC | Age: 59
End: 2024-08-07

## 2024-08-07 NOTE — TELEPHONE ENCOUNTER
Unable to LMOM for pt and offer them a sooner appt on eithwe 8/14 at 2p or 8/19 at 10:30a or 2:30p if still available as they are on the waitlist.

## 2024-08-12 ENCOUNTER — TELEPHONE (OUTPATIENT)
Dept: NEUROLOGY | Facility: CLINIC | Age: 59
End: 2024-08-12

## 2024-08-12 NOTE — TELEPHONE ENCOUNTER
LMOM for pt to offere sooner appt today 8/12 at 12:30p in [facility]. We also have openings on 8/14. Gave c/b # to r/s.

## 2024-08-14 ENCOUNTER — OFFICE VISIT (OUTPATIENT)
Dept: NEUROLOGY | Facility: CLINIC | Age: 59
End: 2024-08-14
Payer: COMMERCIAL

## 2024-08-14 VITALS
SYSTOLIC BLOOD PRESSURE: 126 MMHG | TEMPERATURE: 98.3 F | HEIGHT: 66 IN | DIASTOLIC BLOOD PRESSURE: 78 MMHG | OXYGEN SATURATION: 96 % | BODY MASS INDEX: 33.91 KG/M2 | HEART RATE: 93 BPM | WEIGHT: 211 LBS

## 2024-08-14 DIAGNOSIS — G47.00 INSOMNIA: ICD-10-CM

## 2024-08-14 DIAGNOSIS — G43.109 MIGRAINE WITH AURA AND WITHOUT STATUS MIGRAINOSUS, NOT INTRACTABLE: Primary | ICD-10-CM

## 2024-08-14 DIAGNOSIS — E66.9 OBESITY (BMI 30-39.9): ICD-10-CM

## 2024-08-14 DIAGNOSIS — Z87.820 HISTORY OF CONCUSSION: ICD-10-CM

## 2024-08-14 DIAGNOSIS — G44.309 POST-TRAUMATIC HEADACHE: ICD-10-CM

## 2024-08-14 PROCEDURE — 99214 OFFICE O/P EST MOD 30 MIN: CPT | Performed by: STUDENT IN AN ORGANIZED HEALTH CARE EDUCATION/TRAINING PROGRAM

## 2024-08-14 RX ORDER — PROPRANOLOL HCL 60 MG
60 CAPSULE, EXTENDED RELEASE 24HR ORAL
Qty: 30 CAPSULE | Refills: 6 | Status: SHIPPED | OUTPATIENT
Start: 2024-08-14

## 2024-08-14 RX ORDER — SUMATRIPTAN 100 MG/1
100 TABLET, FILM COATED ORAL ONCE AS NEEDED
Qty: 10 TABLET | Refills: 6 | Status: SHIPPED | OUTPATIENT
Start: 2024-08-14

## 2024-08-14 NOTE — PATIENT INSTRUCTIONS
Additional Testing  MRI brain    Referral  Sleep medicine    Headache/migraine treatment:   Abortive medications (for immediate treatment of a headache): Ok to take ibuprofen or acetaminophen for headaches, but try to limit the amount and frequency that you are taking to avoid medication overuse/rebound headache. Ideally no more than 2-3 days per week.     Prescription Abortive  Sumatriptan (Imitrex) 100 MG tablet; Take 1 tablet (100 mg total) by mouth once as needed for migraine May repeat in 2 hours if needed. Max 2/24 hours. Discussed proper use, possible side effects and risks.     Prescription preventive medications for headaches/migraines   Amitriptyline - 75mg at bedtime  Propranolol - 60mg capsule at bedtime     - Ask eye doctor about taking SNRI's (Venlafaxine) or Topamax - is this okay from a glaucoma standpoint?    Lifestyle Recommendations:  - Maintain good sleep hygiene.  Going to bed and waking up at consistent times, avoiding excessive daytime naps, avoiding caffeinated beverages in the evening, avoid excessive stimulation in the evening and generally using bed primarily for sleeping.  One hour before bedtime would recommend turning lights down lower, decreasing your activity (may read quietly, listen to music at a low volume). When you get into bed, should eliminate all technology (no texting, emailing, playing with your phone, iPad or tablet in bed).  - Maintain good hydration. Drink  2L of fluid a day (4 typical small water bottles)  - Maintain good nutrition. In particular don't skip meals and eat balanced meals regularly.     Education and Follow-up  - Please contact us if any questions or concerns arise. Of course, try to protect yourself from head injuries, and if any new concerning symptoms or significant blow to the head or body go to the emergency department.  - Follow up in 6 months

## 2024-08-14 NOTE — PROGRESS NOTES
Since your last visit are your headaches Worsened    Any change to the headache type? Right side pain has gotten worse    What is your current headache frequency (total headache days out of 30): 30/30 (of those, how many are more severe: 15)    Are you taking your current medications as prescribed? yes      Do you have any side effects? no    How may days per week do you take an abortive medicine? 4-5/7

## 2024-08-14 NOTE — PROGRESS NOTES
[facility] Consult - Follow up   PATIENT:  Marlena Dave  MRN:  [id number]  :  1965  DATE OF SERVICE:  2024  REFERRED BY: No ref. provider found  PMD: Lalita Daly    Assessment/Plan:   Marlena Dave is a very pleasant 59 y.o. female with a past medical history that includes chronic pain, asthma, fibromyalgia, hyperlipidemia here for f/u evaluation of mild TBI/concussion and headaches.     At today's visit, she reports that her headaches flared up because of the summer weather.  She continues with amitriptyline 75 mg at bedtime, but was open to trying propranolol in addition to that.  From an abortive standpoint, rizatriptan would help, but would cause a bit of brain fog after the fact.  She was interested in trying sumatriptan instead.  She continues to struggle with sleep quantity and quality so I have put in a referral to our sleep medicine team for evaluation and treatment.  Due to worsening headaches, she was also interested in an MRI.  Of note, she was recently seen by her eye doctor who reported very early onset glaucoma without any significant concerns.  I have asked her to discuss the use of things like venlafaxine or Topamax with them to see if they are okay from their standpoint if needed in the future.    Workup:  - CT head without contrast 2021: No acute intracranial findings.  Mild ethmoid sinus mucosal thickening.  - Diagnostic sleep study 2023: No evidence of obstructive sleep apnea  - MRI brain  - Referral to sleep medicine    Preventative:  - we discussed headache hygiene and lifestyle factors that may improve headaches  - Amitriptyline 75mg HS, Propranolol 60mg HS  - Currently on through other providers: Buspar, Gabapentin, Lisinopril, Paroxetine  - Past/ failed/contraindicated: Trazodone, avoid SNRI and Topamax due to risk of glaucoma unless cleared by ophthalmology  - future options: Propranolol, Memantine, Diamox, CGRP  med, botox    Acute:  - discussed not taking over-the-counter or prescription pain medications more than 3 days per week to prevent medication overuse/rebound headache  - Sumatriptan 100mg  - Currently on through other providers: Tizanidine  - Past/ failed/contraindicated: Rizatriptan (helped, but caused side effects)  - future options:  Triptan, prochlorperazine, Toradol IM or p.o., could consider trial of 5 days of Depakote 500 mg nightly or dexamethasone 2 mg daily for prolonged migraine, ubrelvy, reyvow, nurtec, zavzpret  Patient instructions   Additional Testing  MRI brain    Referral  Sleep medicine    Headache/migraine treatment:   Abortive medications (for immediate treatment of a headache): Ok to take ibuprofen or acetaminophen for headaches, but try to limit the amount and frequency that you are taking to avoid medication overuse/rebound headache. Ideally no more than 2-3 days per week.     Prescription Abortive  Sumatriptan (Imitrex) 100 MG tablet; Take 1 tablet (100 mg total) by mouth once as needed for migraine May repeat in 2 hours if needed. Max 2/24 hours. Discussed proper use, possible side effects and risks.     Prescription preventive medications for headaches/migraines   Amitriptyline - 75mg at bedtime  Propranolol - 60mg capsule at bedtime     - Ask eye doctor about taking SNRI's (Venlafaxine) or Topamax - is this okay from a glaucoma standpoint?    Lifestyle Recommendations:  - Maintain good sleep hygiene.  Going to bed and waking up at consistent times, avoiding excessive daytime naps, avoiding caffeinated beverages in the evening, avoid excessive stimulation in the evening and generally using bed primarily for sleeping.  One hour before bedtime would recommend turning lights down lower, decreasing your activity (may read quietly, listen to music at a low volume). When you get into bed, should eliminate all technology (no texting, emailing, playing with your phone, iPad or tablet in bed).  -  Maintain good hydration. Drink  2L of fluid a day (4 typical small water bottles)  - Maintain good nutrition. In particular don't skip meals and eat balanced meals regularly.     Education and Follow-up  - Please contact us if any questions or concerns arise. Of course, try to protect yourself from head injuries, and if any new concerning symptoms or significant blow to the head or body go to the emergency department.  - Follow up in 6 months  Subjective:   8/14/24: At today's visit, she feels that her headaches are worse.  She currently endorses daily headaches.  She is currently taking amitriptyline 75 mg at bedtime without any significant benefit.  She feels that her headaches flared up in the Summer. From an abortive standpoint, Rizatriptan typically tends to work well for her. She endorses a bit of brain fog after taking it so she avoids it at work. With regards to sleep, she is getting about 5 hours per night, but it is broken up.     Previous History:  3/22/23: Since her last visit, she reports improvement in terms of her headaches.  Currently experiencing about 3 headaches per week. Finds that they occur when she has to work. Previously was experiencing daily headaches. In terms of sleep, she reports about 6 hours per night. Wakes up at 2:30AM for work. Positive history of snoring. Last sleep study about 13 years ago - mild POLLO.   11/16/22: Ms. Dave presents after an injury at work where she hit her head on a metal beam approximately 6 weeks ago.  Since that time, she has been suffering from daily headaches that have a migrainous quality to them and also endorses difficulties with sleep, word-finding, and balance.  I suspect the majority of her symptoms are related to her intractable headaches.  She did report a prior history of infrequent migraines but states that it has been about 8 years since her last true migraine.  We discussed a variety of treatment options but thought that adjusting her  amitriptyline to nightly use instead of as needed and slightly increasing the dose to 50 mg by make a difference.  We discussed the potential for serotonin syndrome as she also takes paroxetine and trazodone for sleep.  The symptoms that come with this and the potential risks were explained to the patient and she was agreeable to trialing this dose rather than continuing with 25 mg at bedtime.  I did explain that if she were to experience any of the symptoms that come with serotonin syndrome she should stop her amitriptyline and paroxetine and go to the ER immediately.  Potential contributing factors to her headaches and persistent symptoms include sleep apnea and fibromyalgia.  She was not interested in pursuing a new sleep study at this time but will keep it in mind for the future. I also offered an MRI, primarily for reassurance, but she wanted to hold off on that for the time being.  I suspect that as we treat her headaches majority of her symptoms will continue to improve.  She has been encouraged to keep me updated throughout the process and let me know if there are any issues or concerns.   Past Medical History:     Past Medical History:   Diagnosis Date   • Anxiety    • Asthma    • Jose Dias infection    • Fibromyalgia    • GERD (gastroesophageal reflux disease)    • Head injury 10/05/2022   • Hx of hysterectomy    • Insomnia    • Overactive bladder        Patient Active Problem List   Diagnosis   • Lumbar spondylosis   • Chronic pain syndrome   • Myofascial pain syndrome   • Obstructive sleep apnea (adult) (pediatric)       Medications:      Current Outpatient Medications   Medication Sig Dispense Refill   • amitriptyline (ELAVIL) 25 mg tablet take 2 tablets by mouth at bedtime 60 tablet 6   • amitriptyline (ELAVIL) 50 mg tablet      • atorvastatin (LIPITOR) 40 mg tablet      • busPIRone (BUSPAR) 5 mg tablet Take 5 mg by mouth 2 (two) times a day as needed     • celecoxib (CeleBREX) 200 mg capsule      •  cyanocobalamin (VITAMIN B-12) 100 mcg tablet Take by mouth daily     • diphenhydrAMINE (BENADRYL) 25 mg tablet Take 25 mg by mouth every 6 (six) hours as needed for itching     • Empagliflozin-metFORMIN HCl (SYNJARDY PO) Take by mouth     • esomeprazole (NexIUM) 40 MG capsule Take 40 mg by mouth daily before breakfast     • Flovent  MCG/ACT inhaler      • fluticasone (FLONASE) 50 mcg/act nasal spray 1 spray into each nostril daily 16 g 0   • folic acid (FOLVITE) 400 mcg tablet Take 400 mcg by mouth daily     • gabapentin (NEURONTIN) 600 MG tablet Take 600 mg by mouth 2 (two) times a day     • levocetirizine (XYZAL) 5 MG tablet Take 5 mg by mouth     • lisinopril (ZESTRIL) 2.5 mg tablet Take 2.5 mg by mouth daily       • magnesium 30 MG tablet Take 30 mg by mouth 2 (two) times a day     • meclizine (ANTIVERT) 25 mg tablet Take 1 tablet (25 mg total) by mouth 3 (three) times a day as needed for dizziness 30 tablet 0   • montelukast (SINGULAIR) 10 mg tablet Take 10 mg by mouth daily at bedtime       • Multiple Vitamin (multivitamin) capsule Take 1 capsule by mouth daily     • Ozempic, 0.25 or 0.5 MG/DOSE, 2 MG/3ML injection pen Inject 0.25 mg under the skin Once a week     • pantoprazole (PROTONIX) 40 mg tablet      • PARoxetine (PAXIL) 10 mg tablet Take 10 mg by mouth every evening     • propranolol (INDERAL LA) 60 mg 24 hr capsule Take 1 capsule (60 mg total) by mouth daily at bedtime 30 capsule 6   • SUMAtriptan (Imitrex) 100 mg tablet Take 1 tablet (100 mg total) by mouth once as needed for migraine Okay to repeat in 2 hours. No more than 2 tablets in 24 hours 10 tablet 6   • TiZANidine (ZANAFLEX) 2 MG capsule Take 2 mg by mouth daily as needed for muscle spasms       • atorvastatin (LIPITOR) 20 mg tablet Take 10 mg by mouth daily (Patient not taking: Reported on 8/14/2024)     • benzonatate (TESSALON PERLES) 100 mg capsule Take 1 capsule (100 mg total) by mouth every 8 (eight) hours (Patient not taking:  Reported on 10/9/2023) 21 capsule 0   • clotrimazole (LOTRIMIN) 1 % cream Apply 1 application. topically 2 (two) times a day (Patient not taking: Reported on 8/14/2024)     • oxybutynin (DITROPAN-XL) 5 mg 24 hr tablet Take 5 mg by mouth daily (Patient not taking: Reported on 8/14/2024)       No current facility-administered medications for this visit.        Allergies:      Allergies   Allergen Reactions   • Latex      Sensitivity         Family History:     Family History   Problem Relation Age of Onset   • No Known Problems Mother    • No Known Problems Sister    • No Known Problems Sister    • No Known Problems Daughter    • Breast cancer Maternal Grandmother 95   • No Known Problems Paternal Grandmother    • Leukemia Maternal Aunt 56   • No Known Problems Paternal Aunt    • No Known Problems Paternal Aunt    • No Known Problems Paternal Aunt        Social History:     Social History     Socioeconomic History   • Marital status: /Civil Union     Spouse name: Not on file   • Number of children: Not on file   • Years of education: Not on file   • Highest education level: Not on file   Occupational History   • Not on file   Tobacco Use   • Smoking status: Former     Passive exposure: Past   • Smokeless tobacco: Former   Vaping Use   • Vaping status: Never Used   Substance and Sexual Activity   • Alcohol use: Not Currently     Comment: occ   • Drug use: Never   • Sexual activity: Not on file   Other Topics Concern   • Not on file   Social History Narrative   • Not on file     Social Determinants of Health     Financial Resource Strain: Medium Risk (8/7/2024)    Received from [facility]    Overall Financial Resource Strain (CARDIA)    • Difficulty of Paying Living Expenses: Somewhat hard   Food Insecurity: Food Insecurity Present (8/7/2024)    Received from [facility]    Hunger Vital Sign    • Worried About Running Out of Food in the Last Year: Sometimes true    • Ran Out of  "Food in the Last Year: Patient declined   Transportation Needs: No Transportation Needs (8/7/2024)    Received from [facility]    PRAPARE - Transportation    • Lack of Transportation (Medical): No    • Lack of Transportation (Non-Medical): No   Physical Activity: Sufficiently Active (7/14/2023)    Received from [facility]    Exercise Vital Sign    • Days of Exercise per Week: 5 days    • Minutes of Exercise per Session: 30 min   Stress: Stress Concern Present (8/7/2024)    Received from [facility]    Citizen of [place] of Occupational Health - Occupational Stress Questionnaire    • Feeling of Stress : To some extent   Social Connections: Feeling Somewhat Isolated (8/7/2024)    Received from [facility]    OASIS : Social Isolation    • How often do you feel lonely or isolated from those around you?: Sometimes   Intimate Partner Violence: Not At Risk (8/7/2024)    Received from [facility]    Humiliation, Afraid, Rape, and Kick questionnaire    • Fear of Current or Ex-Partner: No    • Emotionally Abused: No    • Physically Abused: No    • Sexually Abused: No   Housing Stability: Unknown (8/7/2024)    Received from [facility]    Housing Stability Vital Sign    • Unable to Pay for Housing in the Last Year: No    • Number of Times Moved in the Last Year: Not on file    • Homeless in the Last Year: No         Objective:   Physical Exam:                                                               Vitals:            Constitutional:  /78 (BP Location: Left arm, Patient Position: Sitting, Cuff Size: Large)   Pulse 93   Temp 98.3 °F (36.8 °C) (Temporal)   Ht 5' 6\" (1.676 m)   Wt 95.7 kg (211 lb)   SpO2 96%   BMI 34.06 kg/m²   BP Readings from Last 3 Encounters:   08/14/24 126/78   12/19/23 120/64   10/09/23 136/78     Pulse Readings from Last 3 Encounters:   08/14/24 93   12/19/23 93   10/09/23 (!) 106 "         Well developed, well nourished, well groomed. No dysmorphic features.       HEENT:  Normocephalic atraumatic. See neuro exam   Chest:  Respirations appear regular and unlabored.    Cardiovascular:  no observed significant swelling.    Musculoskeletal:  (see below under neurologic exam for evaluation of motor function and gait)   Skin:  warm and dry, not diaphoretic.    Psychiatric:  Normal behavior and appropriate affect       Neurological Examination:     Mental status/cognitive function:   Recent and remote memory intact. Attention span and concentration as well as fund of knowledge are appropriate for age. Normal language and spontaneous speech.  Cranial Nerves:  III, IV, VI-Pupils were equal, round. Extraocular movements were full and conjugate   VII-facial expression symmetric  VIII-hearing grossly intact bilaterally   Motor Exam: symmetric bulk throughout. no atrophy, fasciculations or abnormal movements noted.   Coordination:  no apparent dysmetria, ataxia or tremor noted  Gait: steady casual gait    I have spent 15 minutes with Patient  today in which greater than 50% of this time was spent in counseling/coordination of care regarding Prognosis, Risks and benefits of tx options, Patient and family education, Importance of tx compliance, Impressions, Documenting in the medical record, Reviewing / ordering tests, medicine, procedures  , and Obtaining or reviewing history  . I also spent 15 minutes non face to face for this patient the same day.     Activity Minutes   Precharting/reviewing 10   Patient care/counseling 15   Postcharting/care coordination 5       Author:  Wil Ibarra DO 8/14/2024 12:48 PM

## 2024-08-29 ENCOUNTER — HOSPITAL ENCOUNTER (OUTPATIENT)
Dept: MRI IMAGING | Facility: HOSPITAL | Age: 59
End: 2024-08-29
Attending: STUDENT IN AN ORGANIZED HEALTH CARE EDUCATION/TRAINING PROGRAM
Payer: COMMERCIAL

## 2024-08-29 DIAGNOSIS — G43.109 MIGRAINE WITH AURA AND WITHOUT STATUS MIGRAINOSUS, NOT INTRACTABLE: ICD-10-CM

## 2024-08-29 PROCEDURE — 70551 MRI BRAIN STEM W/O DYE: CPT

## 2024-09-01 DIAGNOSIS — E34.8 PINEAL GLAND CYST: Primary | ICD-10-CM

## 2025-02-05 DIAGNOSIS — Z87.820 HISTORY OF CONCUSSION: ICD-10-CM

## 2025-02-05 DIAGNOSIS — G44.309 POST-TRAUMATIC HEADACHE: ICD-10-CM

## 2025-02-05 DIAGNOSIS — G43.109 MIGRAINE WITH AURA AND WITHOUT STATUS MIGRAINOSUS, NOT INTRACTABLE: ICD-10-CM

## 2025-03-08 DIAGNOSIS — G43.109 MIGRAINE WITH AURA AND WITHOUT STATUS MIGRAINOSUS, NOT INTRACTABLE: ICD-10-CM

## 2025-03-10 RX ORDER — PROPRANOLOL HYDROCHLORIDE 60 MG/1
60 CAPSULE, EXTENDED RELEASE ORAL
Qty: 30 CAPSULE | Refills: 6 | Status: SHIPPED | OUTPATIENT
Start: 2025-03-10

## 2025-05-01 ENCOUNTER — TELEPHONE (OUTPATIENT)
Dept: NEUROLOGY | Facility: CLINIC | Age: 60
End: 2025-05-01

## 2025-05-01 NOTE — TELEPHONE ENCOUNTER
LMOM for pt to confirm their  3p   appt on 5/7    w/ Ibarra . Reminded pt to arrive 15 mins prior to appt to check in with . Gave call back number [phone] to cancel/reschedule.

## 2025-06-01 ENCOUNTER — APPOINTMENT (OUTPATIENT)
Dept: URGENT CARE | Age: 60
End: 2025-06-01
Payer: OTHER MISCELLANEOUS

## 2025-06-01 PROCEDURE — 99283 EMERGENCY DEPT VISIT LOW MDM: CPT | Performed by: STUDENT IN AN ORGANIZED HEALTH CARE EDUCATION/TRAINING PROGRAM

## 2025-06-01 PROCEDURE — G0382 LEV 3 HOSP TYPE B ED VISIT: HCPCS | Performed by: STUDENT IN AN ORGANIZED HEALTH CARE EDUCATION/TRAINING PROGRAM

## 2025-06-11 ENCOUNTER — APPOINTMENT (OUTPATIENT)
Age: 60
End: 2025-06-11
Payer: OTHER MISCELLANEOUS

## 2025-06-11 PROCEDURE — 99214 OFFICE O/P EST MOD 30 MIN: CPT | Performed by: PHYSICIAN ASSISTANT

## 2025-06-15 ENCOUNTER — APPOINTMENT (OUTPATIENT)
Dept: RADIOLOGY | Facility: CLINIC | Age: 60
End: 2025-06-15
Payer: OTHER MISCELLANEOUS

## 2025-06-15 DIAGNOSIS — R07.9 CHEST PAIN, UNSPECIFIED TYPE: ICD-10-CM

## 2025-06-15 PROCEDURE — 71101 X-RAY EXAM UNILAT RIBS/CHEST: CPT

## 2025-08-14 ENCOUNTER — HOSPITAL ENCOUNTER (OUTPATIENT)
Dept: MRI IMAGING | Facility: HOSPITAL | Age: 60
End: 2025-08-14
Attending: STUDENT IN AN ORGANIZED HEALTH CARE EDUCATION/TRAINING PROGRAM
Payer: COMMERCIAL